# Patient Record
Sex: FEMALE | Race: WHITE
[De-identification: names, ages, dates, MRNs, and addresses within clinical notes are randomized per-mention and may not be internally consistent; named-entity substitution may affect disease eponyms.]

---

## 2019-07-16 ENCOUNTER — HOSPITAL ENCOUNTER (INPATIENT)
Dept: HOSPITAL 94 - ADULT MH | Age: 34
LOS: 16 days | Discharge: HOME | DRG: 885 | End: 2019-08-01
Attending: PSYCHIATRY & NEUROLOGY | Admitting: PSYCHIATRY & NEUROLOGY
Payer: MEDICARE

## 2019-07-16 VITALS — HEIGHT: 61 IN | WEIGHT: 182.76 LBS | BODY MASS INDEX: 34.51 KG/M2

## 2019-07-16 DIAGNOSIS — Z88.1: ICD-10-CM

## 2019-07-16 DIAGNOSIS — G89.4: ICD-10-CM

## 2019-07-16 DIAGNOSIS — R35.0: ICD-10-CM

## 2019-07-16 DIAGNOSIS — T42.4X2A: ICD-10-CM

## 2019-07-16 DIAGNOSIS — F41.9: ICD-10-CM

## 2019-07-16 DIAGNOSIS — Z79.890: ICD-10-CM

## 2019-07-16 DIAGNOSIS — Z79.899: ICD-10-CM

## 2019-07-16 DIAGNOSIS — F17.219: ICD-10-CM
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
## 2019-07-16 DIAGNOSIS — Z81.8: ICD-10-CM

## 2019-07-16 DIAGNOSIS — Z81.1: ICD-10-CM

## 2019-07-16 DIAGNOSIS — Z88.6: ICD-10-CM

## 2019-07-16 DIAGNOSIS — J45.909: ICD-10-CM

## 2019-07-16 DIAGNOSIS — Z88.8: ICD-10-CM

## 2019-07-16 DIAGNOSIS — F31.5: Primary | ICD-10-CM

## 2019-07-16 DIAGNOSIS — H54.8: ICD-10-CM

## 2019-07-16 DIAGNOSIS — E03.9: ICD-10-CM

## 2019-07-16 DIAGNOSIS — F12.90: ICD-10-CM

## 2019-07-16 DIAGNOSIS — G43.909: ICD-10-CM

## 2019-07-16 DIAGNOSIS — K76.0: ICD-10-CM

## 2019-07-16 PROCEDURE — 82150 ASSAY OF AMYLASE: CPT

## 2019-07-16 PROCEDURE — 84443 ASSAY THYROID STIM HORMONE: CPT

## 2019-07-16 PROCEDURE — 36415 COLL VENOUS BLD VENIPUNCTURE: CPT

## 2019-07-16 PROCEDURE — 82948 REAGENT STRIP/BLOOD GLUCOSE: CPT

## 2019-07-16 PROCEDURE — 83036 HEMOGLOBIN GLYCOSYLATED A1C: CPT

## 2019-07-16 PROCEDURE — 84439 ASSAY OF FREE THYROXINE: CPT

## 2019-07-16 PROCEDURE — 80061 LIPID PANEL: CPT

## 2019-07-16 PROCEDURE — 84480 ASSAY TRIIODOTHYRONINE (T3): CPT

## 2019-07-16 PROCEDURE — 85025 COMPLETE CBC W/AUTO DIFF WBC: CPT

## 2019-07-16 PROCEDURE — 81001 URINALYSIS AUTO W/SCOPE: CPT

## 2019-07-16 PROCEDURE — 83690 ASSAY OF LIPASE: CPT

## 2019-07-16 PROCEDURE — 87081 CULTURE SCREEN ONLY: CPT
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
## 2019-07-17 VITALS — DIASTOLIC BLOOD PRESSURE: 49 MMHG | SYSTOLIC BLOOD PRESSURE: 103 MMHG

## 2019-07-17 VITALS — DIASTOLIC BLOOD PRESSURE: 68 MMHG | SYSTOLIC BLOOD PRESSURE: 115 MMHG

## 2019-07-17 LAB
CHOLEST SERPL-MCNC: 152 MG/DL (ref 0–200)
CHOLEST/HDLC SERPL: 3.5 {RATIO} (ref 0–4.99)
HBA1C MFR BLD: 5 % (ref 4.5–6.2)
HDLC SERPL-MCNC: 44 MG/DL (ref 35–60)
LDLC SERPL DIRECT ASSAY-MCNC: 91 MG/DL (ref 50–100)
TRIGL SERPL-MCNC: 99 MG/DL (ref 20–135)

## 2019-07-17 RX ADMIN — Medication SCH MG: at 20:56

## 2019-07-17 RX ADMIN — FENTANYL TRANSDERMAL SCH PATCH: 25 PATCH, EXTENDED RELEASE TRANSDERMAL at 14:54

## 2019-07-17 RX ADMIN — LEVOTHYROXINE SODIUM SCH MCG: 88 TABLET ORAL at 17:18

## 2019-07-17 RX ADMIN — PROPRANOLOL HYDROCHLORIDE SCH MG: 10 TABLET ORAL at 20:54

## 2019-07-17 NOTE — NUR
Nursing Progress Note



Legal hold: 5150



Client on involuntary status for DTS.



Report received from Ruby SON, with use of SBAR.



Why are they here: The patient is a 33 year old female admitted on a 5150 hold for DTS. She 
was transported by EMS from [facility] after being evaluated by [facility] and a 5150 was written. She reportedly took 20 Klonopin tabs in a 
suicide attempt. While in there ER she AWOL'd from the ER. She was in physical restraints 
for over 24 hours at one point. When asked why she was in restraints she stated "because the 
doctor was a bitch" She also required increase observation while using the bathroom because 
she was engaging in self harm behaviors. She has an open area on her left hand from 
scratching. 



Assessment



What has happened this shift: Pt resting at change of shift. Once awake she reports needing 
a nicotine patch, Ativan and a Fentanyl patch for pain. Writer encouraged her to take a hot 
shower to assist with pain management, which she reports did not help. Writer consulted with 
Dr Giordano, Dr Saldana and pharmacy staff to get patch filled. Her Fentanyl patch from admit 
was removed and wasted with Yo SON. New patch was placed on Left Lower Abdomen. She has 
utilized her Ativan X2, Atarax today. Her Synthroid was also increased today. She was given 
oxycodone PO X1 to manage her pain which she consistently c/o all day. She was tearful and 
anxious all day. By the end of shift she was resting peacefully. PPD placed today. 









Assessment:



SI/HI: Confirms SI, says it worsens with pain

A/VH: report 

Sleep: 7.25 hrs NOC

ADL's: independent

Group attendance: yes

Were Meds taken: Yes

Any med S/E: Reports she has reaction to metal in nicotine patch and would like it changed 
to the clear kind



Mental Status Exam



Appearance: clean, hair braided

Eye contact: direct, thick glasses

Behavior: anxious, pained

Speech: clear, normal rate and rhythm

Mood: anxious, depressed

Affect: depressed

Thought process: linear

Thought Content: focused on pain management

Cognition: A & O X4

Insight: fair

Judgment: poor 



Interventions



PRN's used: Ativan X2, Atarax, Nicotine patch



Therapeutic interventions: attempted 1:1 assessment, provided therapeutic communication, 
redirection from excessive drinking when needed, encouraged to go to groups, medication 
administration/education/monitoring for compliance, monitor Q15 minutes for safety. Line of 
sight 



Restraints/seclusion/emergency medication: Line of Sight



Justification of Continued Inpatient Treatment: Pt. had a severe suicide attempt further 
medication adjustment to stabilize current crisis is needed. Without adequate treatment for 
current situation, pt is at high risk for readmission if discharged at this time.

## 2019-07-17 NOTE — NUR
Malnutrition consult: Pt admit w/ depression noted to have no edema/wounds, no weakness, PO 
100% regular meals meeting needs w/ BMI 33. At this time pt does not qualify for 
malnutrition.

-------------------------------------------------------------------------------

Addendum: 07/17/19 at 1120 by Juan [address]unas RD

-------------------------------------------------------------------------------

Amended: Links added.

## 2019-07-17 NOTE — NUR
Nursing Progress Note



Legal hold: 5150



Client on involuntary status for DTS.



Report received from Yo SON, with use of SBAR.



Why are they here: The patient is a 33 year old female admitted on a 5150 hold for DTS. She 
was transported by EMS from [facility] after being evaluated by [facility] and a 5150 was written. She reportedly took 20 Klonopin tabs in a 
suicide attempt. While in there ER she AWOL'd from the ER. She was in physical restraints 
for over 24 hours at one point. When asked why she was in restraints she stated "because the 
doctor was a bitch" She also required increase observation while using the bathroom because 
she was engaging in self harm behaviors. She has an open area on her left hand from 
scratching. 



Assessment



What has happened this shift: Patient is in bed with head covered at change of shift. She 
has a sitter at her bedside. Patient agrees to a 1:1 assessment. She states she is still 
having suicidal thoughts with a plan and expresses "That there's less risk (of her acting on 
her SI), I feel safe here." When asked if she would contract for safety she states "There is 
still some risk". She states she has 10/10 depression and that she is still having AH. She 
say's she feels better and that her pain has decreased to 6/10. Patient is isolative and 
does not leave her room or bed this shift except to use the restroom. She is compliant with 
her evening medications. 



Assessment:



SI/HI: Confirms SI, states "There's less risk, I feel safe here." But would not contract for 
safety stating "There is still some risk."

A/VH: AH

Sleep: See sleep assessment

ADL's: Independent

Group attendance: No groups this shift

Were Meds taken: Yes

Any med S/E: None reported or observed



Mental Status Exam



Appearance: Clean, hair braided

Eye contact: Direct, legally blind, but still has minimal vision looks at person talking.

Behavior: Anxious, isolative

Speech: Clear, normal rate and rhythm

Mood: Anxious, depressed

Affect: Congruent to mood

Thought process: Linear

Thought Content: Focused on her anxiety

Cognition: A & O X4

Insight: Fair

Judgment: Poor 



Interventions



PRN's used: Atarax



Therapeutic interventions: 1:1 assessment at bedside, provided therapeutic communication, 
encouraged to go to groups, medication administration/education/monitoring for compliance, 
monitor Q15 minutes for safety. Line of sight 



Restraints/seclusion/emergency medication: Line of Sight



Justification of Continued Inpatient Treatment: Pt. had a severe suicide attempt further 
medication adjustment to stabilize current crisis is needed. Without adequate treatment for 
current situation, pt is at high risk for readmission if discharged at this time.

## 2019-07-18 VITALS — DIASTOLIC BLOOD PRESSURE: 64 MMHG | SYSTOLIC BLOOD PRESSURE: 114 MMHG

## 2019-07-18 VITALS — DIASTOLIC BLOOD PRESSURE: 78 MMHG | SYSTOLIC BLOOD PRESSURE: 129 MMHG

## 2019-07-18 VITALS — SYSTOLIC BLOOD PRESSURE: 113 MMHG | DIASTOLIC BLOOD PRESSURE: 63 MMHG

## 2019-07-18 RX ADMIN — NICOTINE SCH PATCH: 21 PATCH, EXTENDED RELEASE TRANSDERMAL at 10:56

## 2019-07-18 RX ADMIN — LEVOTHYROXINE SODIUM SCH MCG: 88 TABLET ORAL at 07:04

## 2019-07-18 RX ADMIN — Medication SCH MG: at 20:20

## 2019-07-18 RX ADMIN — PROPRANOLOL HYDROCHLORIDE SCH MG: 10 TABLET ORAL at 13:08

## 2019-07-18 RX ADMIN — PROPRANOLOL HYDROCHLORIDE SCH MG: 10 TABLET ORAL at 20:20

## 2019-07-18 RX ADMIN — LEVOTHYROXINE, LIOTHYRONINE SCH MG: 19; 4.5 TABLET ORAL at 08:37

## 2019-07-18 RX ADMIN — DULOXETINE HYDROCHLORIDE SCH MG: 30 CAPSULE, DELAYED RELEASE ORAL at 08:36

## 2019-07-18 RX ADMIN — PROPRANOLOL HYDROCHLORIDE SCH MG: 10 TABLET ORAL at 08:36
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
## 2019-07-18 NOTE — NUR
Nursing Progress Note



Legal hold: 5150



Client on involuntary status for DTS.



Report received from Yo SON, with use of SBAR.



Why are they here: The patient is a 33 year old female admitted on a 5150 hold for DTS. She 
was transported by EMS from [facility] after being evaluated by [facility] and a 5150 was written. She reportedly took 20 Klonopin tabs in a 
suicide attempt. While in there ER she AWOL'd from the ER. She was in physical restraints 
for over 24 hours at one point. When asked why she was in restraints she stated "because the 
doctor was a bitch" She also required increase observation while using the bathroom because 
she was engaging in self harm behaviors. She has an open area on her left hand from 
scratching. 



Assessment



What has happened this shift: Patient is in bed with head covered at change of shift 
observation. She has a sitter at her bedside. Patient agrees to a 1:1 assessment. Asked how 
she felt today in terms of safety patient responded "I'm very suicidal. I still need someone 
to watch me. Probably for the next 24 hours."  When asked if she would contract for safety 
patient stated "I'll let you know if it gets bad."  Did not mention physical pain this 
shift.

Scheduled Ativan given at 1230. Patient slept for remainder of afternoon except to use 
bathroom.  



Assessment:



SI/HI: Admits to SI

A/VH: AH

Sleep: Napped this afternoon

ADL's: Independent/Showered

Group attendance: No groups this shift

Were Meds taken: Yes

Any med S/E: None reported or observed



Mental Status Exam



Appearance: Clean, hair braided

Eye contact: Direct, legally blind, but still has minimal vision looks at person talking.

Behavior: Anxious, isolative

Speech: Clear, normal rate and rhythm

Mood: Anxious, depressed

Affect: Congruent to mood

Thought process: Linear

Thought Content: Focused on her anxiety

Cognition: A & O X4

Insight: Fair

Judgment: Poor 



Interventions



PRN's used: Atarax 50mg./Ativan 1 mg./Zyprexa 10 mg.



Therapeutic interventions: 1:1 assessment at bedside, provided therapeutic communication, 
encouraged to go to groups, medication administration/education/monitoring for compliance, 
monitor Q15 minutes for safety. Line of sight 



Restraints/seclusion/emergency medication: Line of Sight



Justification of Continued Inpatient Treatment: Pt. had a severe suicide attempt further 
medication adjustment to stabilize current crisis is needed. Without adequate treatment for 
current situation, pt is at high risk for readmission if discharged at this time.

## 2019-07-18 NOTE — NUR
Nursing Progress Note



Legal hold: 5150



Client on involuntary status for DTS.



Report received from Sujata SON, with use of SBAR.



Why are they here: The patient is a 33 year old female admitted on a 5150 hold for DTS. She 
was transported by EMS from [facility] after being evaluated by [facility] and a 5150 was written. She reportedly took 20 Klonopin tabs in a 
suicide attempt. While in there ER she AWOL'd from the ER. She was in physical restraints 
for over 24 hours at one point. When asked why she was in restraints she stated "because the 
doctor was a bitch" She also required increase observation while using the bathroom because 
she was engaging in self harm behaviors. She has an open area on her left hand from 
scratching. 



Assessment



What has happened this shift: Patient is in bed with head covered at change of shift 
observation. She has a sitter at her bedside at her door observing her. Patient confirms SI 
stating "I would bang my head against the wall." She say's she still feels she is at risk 
for doing this. She say's she is having AH the voices say "I'm worthless, they tell me to 
kill myself." She say's at time the voices can be command. Patient expresses that she feels 
fatigued and she thinks it is related to her medication. She is compliant with evening 
medications and makes her bed after HS meds, then goes to bed. 



Assessment:



SI/HI: Admits to SI

A/VH: AH

Sleep: See sleep assessment

ADL's: Independent/Showered

Group attendance: No groups this shift

Were Meds taken: Yes

Any med S/E: None reported or observed



Mental Status Exam



Appearance: Clean, hair braided

Eye contact: Direct, legally blind, but still has minimal vision looks at person talking.

Behavior: Anxious, isolative

Speech: Clear, normal rate and rhythm

Mood: Anxious, depressed

Affect: Congruent to mood

Thought process: Linear

Thought Content: Focused on SI and medications making her tired

Cognition: A & O X4

Insight: Fair

Judgment: Poor 



Interventions



PRN's used: Trazodone



Therapeutic interventions: 1:1 assessment at bedside, provided therapeutic communication, 
encouraged to go to groups, medication administration/education/monitoring for compliance, 
monitor Q15 minutes for safety. Line of sight 



Restraints/seclusion/emergency medication: Line of Sight



Justification of Continued Inpatient Treatment: Pt. had a severe suicide attempt further 
medication adjustment to stabilize current crisis is needed. Without adequate treatment for 
current situation, pt is at high risk for readmission if discharged at this time.

## 2019-07-19 VITALS — DIASTOLIC BLOOD PRESSURE: 61 MMHG | SYSTOLIC BLOOD PRESSURE: 115 MMHG

## 2019-07-19 VITALS — SYSTOLIC BLOOD PRESSURE: 123 MMHG | DIASTOLIC BLOOD PRESSURE: 68 MMHG

## 2019-07-19 RX ADMIN — LEVOTHYROXINE, LIOTHYRONINE SCH MG: 19; 4.5 TABLET ORAL at 07:52

## 2019-07-19 RX ADMIN — PROPRANOLOL HYDROCHLORIDE SCH MG: 10 TABLET ORAL at 07:52

## 2019-07-19 RX ADMIN — DULOXETINE HYDROCHLORIDE SCH MG: 30 CAPSULE, DELAYED RELEASE ORAL at 07:52

## 2019-07-19 RX ADMIN — OXYCODONE PRN MG: 5 TABLET ORAL at 12:37

## 2019-07-19 RX ADMIN — NICOTINE SCH PATCH: 21 PATCH, EXTENDED RELEASE TRANSDERMAL at 08:00

## 2019-07-19 RX ADMIN — OLANZAPINE PRN MG: 5 TABLET, ORALLY DISINTEGRATING ORAL at 12:37

## 2019-07-19 RX ADMIN — PROPRANOLOL HYDROCHLORIDE SCH MG: 10 TABLET ORAL at 20:11

## 2019-07-19 RX ADMIN — Medication SCH MG: at 20:10

## 2019-07-19 RX ADMIN — LEVOTHYROXINE SODIUM SCH MCG: 88 TABLET ORAL at 07:51

## 2019-07-19 RX ADMIN — PROPRANOLOL HYDROCHLORIDE SCH MG: 10 TABLET ORAL at 12:37

## 2019-07-19 NOTE — NUR
Nursing Note: Pt. refusing to remove Nicotine Patch at HS, this writer provided education 
r/t to possibility of it causing NM, however pt. continues to refuse. Will endorse to AM 
shift

## 2019-07-19 NOTE — NUR
Verbal order from Dr Giordano to give hydroxyzine 50mg po stat d/t pt anxiety. Scheduled 
dose was in 30mins, override initiated, hydroxyzine administered.

## 2019-07-19 NOTE — NUR
Nursing Note: Pt. presented with 5250, signed, and reports she is agreeable with continued 
time on the unit in order to stabilize. Also, this writer received a phone call and fax from 
pt's MD, Michelle Black from [facility]. Fax provided 
additional medical information regarding pt's condition. Will endorse to AM shift, and leave 
for Dr. Giordano in the morning.

## 2019-07-19 NOTE — NUR
Nursing Progress Note



Legal hold: 5150



Client on involuntary status for DTS.



Report received from Samara Jones RN, with use of SBAR.



Why are they here: The patient is a 33 year old female admitted on a 5150 hold for DTS. She 
was transported by EMS from [facility] after being evaluated by [facility] and a 5150 was written. She reportedly took 20 Klonopin tabs in a 
suicide attempt. While in there ER she AWOL'd from the ER. She was in physical restraints 
for over 24 hours at one point. When asked why she was in restraints she stated "because the 
doctor was a bitch" She also required increase observation while using the bathroom because 
she was engaging in self harm behaviors. She has an open area on her left hand from 
scratching. 



Assessment



What has happened this shift: Patient is in bed with head covered at change of shift. She 
has a sitter at her bedside at her door observing her. Patient confirms SI but doesnt 
elaborate. She say's she still feels she is at risk. She reports AH that tell her, "I'm 
worthless, they tell me to kill myself." Patient looks tired. After breakfast she requests 
to use the phone to call [facility] to make a complaint. She also 
spoke with this writer about the [facility] schedule of activites and how it needs to accommodate 
people with disabilities, it should be spaced correctly and have at least 14 font. Which it 
clearly does not. Writer spoke with her about using a magnifying glass which she could use 
to read the schedule, books, and group handouts. JIMMY Matson also reports she increased all 
font sizes on her group handouts to accommodate pt. She is compliant with AM medications and 
requests all PRN medications as well. Pt preemptively asked for Zyprexa zydis saying, Can 
you give me that dissolvable Zyprexa before lunch? I think Ill need it then. RN encouraged 
her to use her medications when she has sx that warrant the use. She then requested it 
before lunch and asked how often it is scheduled for. RN advised her BID and she immediately 
asked to take another one shortly, although she is in no apparent distress and is resting 
peacefully on her bed. RN advised against this and encouraged her to rest. Pt then requested 
her Oxycodone. RN consulted with Dr Giordano about lowering her Propranolol d/t pt c/o 
dizziness. Propranolol was changed to 10mg PO TID. RN also requested to take pt off of LOS 
as she has remained resting in her bed a majority of the shift. She had a phone call with 
her sister which went well. PPD was read today which was Negative.



Assessment:



SI/HI: reports SI, no plan

A/VH: reports command AH

Sleep: See sleep assessment

ADL's: Independent/Showered

Group attendance: 

Were Meds taken: Yes

Any med S/E: None reported or observed



Mental Status Exam



Appearance: Clean, purple sweater

Eye contact: Direct, legally blind, but still has minimal vision looks at person talking.

Behavior: Sleepy, isolative

Speech: Mumbles softly into pillow during assessment with eyes closed

Mood: "Depressed and in pain"

Affect: Congruent to mood

Thought process: goal oriented

Thought Content: Focused on medications, timing of medications & pain

Cognition: A & O X4

Insight: Fair

Judgment: Poor 



Interventions



PRN's used: Atarax X2, Tylenol, Zyprexa zydis X2, Oxycodone



Therapeutic interventions: 1:1 assessment at bedside, provided therapeutic communication, 
encouraged to go to groups, medication administration/education/monitoring for compliance, 
monitor Q15 minutes for safety. Line of sight 



Restraints/seclusion/emergency medication: Line of Sight



Justification of Continued Inpatient Treatment: Pt. had a severe suicide attempt further 
medication adjustment to stabilize current crisis is needed. Without adequate treatment for 
current situation, pt is at high risk for readmission if discharged at this time.

## 2019-07-20 VITALS — SYSTOLIC BLOOD PRESSURE: 131 MMHG | DIASTOLIC BLOOD PRESSURE: 80 MMHG

## 2019-07-20 VITALS — DIASTOLIC BLOOD PRESSURE: 79 MMHG | SYSTOLIC BLOOD PRESSURE: 128 MMHG

## 2019-07-20 LAB
BACTERIA URNS QL MICRO: (no result) /HPF
CLARITY UR: (no result)
COLOR UR: (no result)
DEPRECATED SQUAMOUS URNS QL MICRO: (no result) /LPF
GLUCOSE UR STRIP-MCNC: NEGATIVE MG/DL
HGB UR QL STRIP: NEGATIVE
KETONES UR STRIP-MCNC: NEGATIVE MG/DL
LEUKOCYTE ESTERASE UR QL STRIP: NEGATIVE
MUCOUS THREADS URNS QL MICRO: (no result) /LPF
NITRITE UR QL STRIP: NEGATIVE
PH UR STRIP: 6.5 [PH] (ref 4.8–8)
PROT UR QL STRIP: NEGATIVE MG/DL
RBC #/AREA URNS HPF: (no result) /HPF (ref 0–2)
SP GR UR STRIP: <=1.005 (ref 1–1.03)
URN COLLECT METHOD CLASS: (no result)
UROBILINOGEN UR STRIP-MCNC: 0.2 E.U/DL (ref 0.2–1)
WBC #/AREA URNS HPF: (no result) /HPF (ref 0–4)

## 2019-07-20 RX ADMIN — DULOXETINE HYDROCHLORIDE SCH MG: 30 CAPSULE, DELAYED RELEASE ORAL at 07:47

## 2019-07-20 RX ADMIN — PANCRELIPASE LIPASE, PANCRELIPASE AMYLASE, AND PANCRELIPASE PROTEASE SCH EACH: 4200; 24600; 14200 CAPSULE, DELAYED RELEASE ORAL at 17:55

## 2019-07-20 RX ADMIN — FENTANYL TRANSDERMAL SCH PATCH: 25 PATCH, EXTENDED RELEASE TRANSDERMAL at 15:10

## 2019-07-20 RX ADMIN — LEVOTHYROXINE SODIUM SCH MCG: 88 TABLET ORAL at 07:47

## 2019-07-20 RX ADMIN — PROPRANOLOL HYDROCHLORIDE SCH MG: 10 TABLET ORAL at 07:49

## 2019-07-20 RX ADMIN — PROPRANOLOL HYDROCHLORIDE SCH MG: 10 TABLET ORAL at 20:27

## 2019-07-20 RX ADMIN — LEVOTHYROXINE, LIOTHYRONINE SCH MG: 19; 4.5 TABLET ORAL at 07:47

## 2019-07-20 RX ADMIN — Medication SCH MG: at 20:27

## 2019-07-20 RX ADMIN — PROPRANOLOL HYDROCHLORIDE SCH MG: 10 TABLET ORAL at 12:46

## 2019-07-20 RX ADMIN — NICOTINE SCH PATCH: 21 PATCH, EXTENDED RELEASE TRANSDERMAL at 07:53

## 2019-07-20 RX ADMIN — OLANZAPINE PRN MG: 5 TABLET, ORALLY DISINTEGRATING ORAL at 15:10

## 2019-07-20 RX ADMIN — OXYCODONE PRN MG: 5 TABLET ORAL at 07:48

## 2019-07-20 NOTE — NUR
Nursing Progress Note



Legal hold: 5150



Client on involuntary status for DTS.



Report received from Samara Jones RN, with use of SBAR.



Why are they here: The patient is a 33 year old female admitted on a 5150 hold for DTS. She 
was transported by EMS from [facility] after being evaluated by [facility] and a 5150 was written. She reportedly took 20 Klonopin tabs in a 
suicide attempt. While in there ER she AWOL'd from the ER. She was in physical restraints 
for over 24 hours at one point. When asked why she was in restraints she stated "because the 
doctor was a bitch" She also required increase observation while using the bathroom because 
she was engaging in self harm behaviors. She has an open area on her left hand from 
scratching. 



Assessment



What has happened this shift: Patient awakened before breakfast for medications and 
breakfast.  Patient states that she is very anxious and has been requesting all prns as soon 
as they are due.  Patient has poor vision and uses magnifying glass to look through.  
Patient reports that she is still suicidal without plan. Reports command A/H telling her to 
kill herself.  



SI/HI: reports SI, no plan

A/VH: reports command AH

Sleep: 7.5 hrs. NOC. Naps during daytime.

ADL's: Independent.

Group attendance: No.

Were Meds taken: Yes



Any med S/E: None reported or observed



Mental Status Exam



Appearance: Slightly disheveled in appearance, unit appropriate.

Eye contact: Direct, legally blind, but still has minimal vision looks at person talking.

Behavior: Sleepy, isolative

Speech: Clear, normal volume and rate.

Mood: Depressed, anxious.

Affect: Blunted.

Thought process: goal oriented

Thought Content: Focused on medications, timing of medications & pain

Cognition: A & O X4

Insight: Fair

Judgment: Poor 



Interventions



PRN's used: Atarax, Ativan, Tylenol, Zyprexa zydis, Oxycodone



Therapeutic interventions: 1:1 assessment at bedside, provided therapeutic communication, 
encouraged to go to groups, medication administration/education/monitoring for compliance, 
monitor Q15 minutes for safety. Line of sight 



Restraints/seclusion/emergency medication: None.



Justification of Continued Inpatient Treatment: Pt. had a severe suicide attempt further 
medication adjustment to stabilize current crisis is needed. Without adequate treatment for 
current situation, pt is at high risk for readmission if discharged at this time.

## 2019-07-20 NOTE — NUR
Nursing Progress Note:



Legal hold: 5250



Client on involuntary status for DTS



Report received from nurse with use of SBAR: ADELAIDA Ernst



Why are they here: The patient is a 33 year old female admitted on a 5150 hold for DTS. She 
was transported by EMS from [facility] after being evaluated by [facility] and a 5150 was written. She reportedly took 20 Klonopin tabs in a 
suicide attempt r/t interfamilial and job stress. While in there ER she AWOL'd from the ER. 
She was in physical restraints for over 24 hours at one point. She also required increase 
observation while using the bathroom because she was engaging in self harm behaviors and was 
on LOS X2 days while at [facility]. She has an open area on her left hand from scratching. Pt. 
endorses command A/HA and has a hx of psychiatric hospitalizations and suicide attempts. 





Assessment



What has happened this shift: Pt. laying in bed sleeping at the beginning of the shift, 
later approached this writer to request a PRN for anxiety, however did not exhibit any s/s 
of of anxiety. Scheduled Zyprexa administered and pt. reported effectiveness. Pt. questioned 
this writer regarding the time of day and "if she had received all of her morning 
medications?" This writer educated pt. that it was now evening, and assured her that she had 
received all of her AM medications, pt. voiced understanding. Pt. is alert and oriented X3, 
however in regard to place reported that she is at [facility]. Pt. reports ongoing S/I 
with a plan to overdose on medications. She denies any self-harm behaviors and none 
exhibited this shift. Pt. endorses ongoing command A/HA, however reports they are better 
since taking medications. She reports she lives with her mother and this can be stressful 
because they do not agree on certain things, however she states a coping mechanism as, 
"Thinking about the ocean." Pt. reports she attends groups, but has a hard time 
participating at times because of her poor sight. This writer assured pt. she  would endorse 
this concern to AM shift, and pt. reported content. Pt. appeared to become increasingly 
restless at HS and reported insomnia, PRN Atrax and Trazodone administered with 
effectiveness. 

S/I, H/I: Continued S/I with a plan to overdose on medications

A/VH: Ongoing Command A/HA

Sleep: Difficulty falling asleep, PRN Trazodone and MRX1 dose administered with 
effectiveness

ADL's: Independent

Group attendance: Reports she attends groups, however has a difficult time participating r/t 
her visual disability. 

Were meds taken: Yes

Any med S/E: None





Mental Status Exam



Appearance: Neat and appropriately dressed

Eye contact: Good

Behavior: Cooperative with some restlessness and fatigue

Speech: WNL

Mood: Restless with ongoing depression

Affect: Constricted

Thought process: Linear

Thought Content: Command A/HA and preoccupation with depressed mood and S/I

Cognition: A&O X3 (not to place, reports [facility])

Insight: Poor to fair

Judgment: Poor to fair



Interventions



PRN's used: Atrax X1, Tylenol X1, Trazodone X2 (MRX1 dose)

Therapeutic interventions: Introduced self and established rapport, ensured contract for 
safety, encouraged independent performance of ADLs, monitored behavior and need for 
intervention, reoriented to reality as needed, presented pt. with 5250 paperwork and 
obtained signature, and maintained Q 15 min safety checks. 

Restraints/seclusion/emergency medication: N/A





Justification of Continued Inpatient Treatment: Pt. continues to require interruption of 
crisis, medication adjustments, and a safe and therapeutic environment.

## 2019-07-21 VITALS — DIASTOLIC BLOOD PRESSURE: 50 MMHG | SYSTOLIC BLOOD PRESSURE: 99 MMHG

## 2019-07-21 VITALS — DIASTOLIC BLOOD PRESSURE: 81 MMHG | SYSTOLIC BLOOD PRESSURE: 125 MMHG

## 2019-07-21 LAB
AMYLASE SERPL-CCNC: 48 U/L (ref 25–115)
LIPASE SERPL-CCNC: 107 U/L (ref 73–393)

## 2019-07-21 RX ADMIN — PROPRANOLOL HYDROCHLORIDE SCH MG: 10 TABLET ORAL at 12:28

## 2019-07-21 RX ADMIN — PANCRELIPASE LIPASE, PANCRELIPASE AMYLASE, AND PANCRELIPASE PROTEASE SCH EACH: 4200; 24600; 14200 CAPSULE, DELAYED RELEASE ORAL at 17:52

## 2019-07-21 RX ADMIN — DULOXETINE HYDROCHLORIDE SCH MG: 30 CAPSULE, DELAYED RELEASE ORAL at 07:46

## 2019-07-21 RX ADMIN — MAGNESIUM HYDROXIDE PRN ML: 400 SUSPENSION ORAL at 10:33

## 2019-07-21 RX ADMIN — LEVOTHYROXINE, LIOTHYRONINE SCH MG: 19; 4.5 TABLET ORAL at 07:46

## 2019-07-21 RX ADMIN — Medication SCH MG: at 20:36

## 2019-07-21 RX ADMIN — OXYCODONE PRN MG: 5 TABLET ORAL at 13:38

## 2019-07-21 RX ADMIN — PANCRELIPASE LIPASE, PANCRELIPASE AMYLASE, AND PANCRELIPASE PROTEASE SCH EACH: 4200; 24600; 14200 CAPSULE, DELAYED RELEASE ORAL at 12:28

## 2019-07-21 RX ADMIN — PROPRANOLOL HYDROCHLORIDE SCH MG: 10 TABLET ORAL at 07:47

## 2019-07-21 RX ADMIN — PROPRANOLOL HYDROCHLORIDE SCH MG: 10 TABLET ORAL at 20:36

## 2019-07-21 RX ADMIN — OLANZAPINE PRN MG: 5 TABLET, ORALLY DISINTEGRATING ORAL at 00:28

## 2019-07-21 RX ADMIN — LEVOTHYROXINE SODIUM SCH MCG: 88 TABLET ORAL at 06:36

## 2019-07-21 RX ADMIN — NICOTINE SCH PATCH: 21 PATCH, EXTENDED RELEASE TRANSDERMAL at 07:46

## 2019-07-21 RX ADMIN — DOCUSATE SODIUM SCH MG: 100 CAPSULE, LIQUID FILLED ORAL at 19:49

## 2019-07-21 RX ADMIN — DOCUSATE SODIUM SCH MG: 100 CAPSULE, LIQUID FILLED ORAL at 10:33

## 2019-07-21 RX ADMIN — OLANZAPINE PRN MG: 5 TABLET, ORALLY DISINTEGRATING ORAL at 10:33

## 2019-07-21 RX ADMIN — PANCRELIPASE LIPASE, PANCRELIPASE AMYLASE, AND PANCRELIPASE PROTEASE SCH EACH: 4200; 24600; 14200 CAPSULE, DELAYED RELEASE ORAL at 07:47

## 2019-07-21 NOTE — NUR
Nursing Progress Note:



Legal hold: 5250



Client on involuntary status for DTS



Report received from nurse with use of SBAR: ADELAIDA Ernst



Why are they here: The patient is a 33 year old female admitted on a 5150 hold for DTS. She 
was transported by EMS from [facility] after being evaluated by [facility] and a 5150 was written. She reportedly took 20 Klonopin tabs in a 
suicide attempt r/t interfamilial and job stress. While in there ER she AWOL'd from the ER. 
She was in physical restraints for over 24 hours at one point. She also required increase 
observation while using the bathroom because she was engaging in self harm behaviors and was 
on LOS X2 days while at [facility]. She has an open area on her left hand from scratching. Pt. 
endorses command A/HA and has a hx of psychiatric hospitalizations and suicide attempts. 





Assessment



What has happened this shift: Pt. up in Group Room at the beginning of the shift interacting 
appropriately with others, and appears more animated than the day before. Immediately 
following HS snack she retreats to her room and requests HS medications. 1:1 completed at 
bedside, affect remains constricted, however pt. is cooperative and pleasant with underlying 
anxiety. She reports ongoing S/I with a plan to overdose on medications, but is able to 
contract for safety and no self harm behaviors exhibited. This writer obtained picture of 
previously self-inflicted abrasion on left hand, dsg covering it is CDI, and picture placed 
in chart. Pt. states, "My mood is mixed, up and down between manic and sad." However, she 
reports that her command A/H are better and much harder to distinguish since taking Zyprexa. 
She also reports that she has been having conversations with her mother and sister on the 
telephone and the conversations have been positive. Pt. is unsure if she will be returning 
straight home  of if she will possibly be going to a step-down facility upon discharge, she 
plans to discuss this more with the . Pt. again appears to become increasingly 
restless at HS and reports insomnia, PRN Atrax and Trazodone X2 administered and will 
monitor. Also, Fentanyl patch placement verified upon right flank and Tegaderm Dressing 
placed over it to hold in place. 

S/I, H/I: Continued S/I with a plan to overdose on medications

A/VH: Ongoing Command A/HA, however hard to distinguish

Sleep: Difficulty falling asleep, PRN Trazodone and MRX1 dose administered 

ADL's: Independent

Group attendance: Reports she attends groups

Were meds taken: Yes

Any med S/E: None





Mental Status Exam



Appearance: Neat and appropriately dressed

Eye contact: Good, however pt. looks down a lot

Behavior: Cooperative with some anxiety and fatigue

Speech: WNL

Mood: More animated today

Affect: Constricted

Thought process: Linear

Thought Content: Command A/HA and preoccupation with depressed mood and S/I

Cognition: A&O X4

Insight: Poor to fair

Judgment: Poor to fair



Interventions



PRN's used: Atrax X1, Tylenol X1, Trazodone X2 (MRX1 dose), and Zyprexa Zydis

Therapeutic interventions: Maintained a safe and therapeutic environment, ensured contract 
for safety, encouraged independent performance of ADLs, monitored behavior and need for 
intervention, reoriented to reality as needed, obtained picture of previously self-inflicted 
abrasion on left hand, and maintained Q 15 min safety checks. 

Restraints/seclusion/emergency medication: N/A





Justification of Continued Inpatient Treatment: Pt. continues to require interruption of 
crisis, medication adjustments, and a safe and therapeutic environment.

## 2019-07-21 NOTE — NUR
DISCHARGE PLANNING:



Spoke w/ staff at [facility] in [facility], similar to [facility]. They can not take pt for same 
reason [facility] can not, pt must be from [facility] or relocating to [facility]. Spoke to Ginette 
at [facility] @ [phone] and explained looking for a step-down housing 
situation (explained what [facility] is) they do not have anything like that but she said they 
might have some type of step-down support housing pt could go to, she would find out and 
contact us. Also asked if they had a list of [facility], she will also check. Gave her fax # and 
Cathleen's #. Also gave pt Dr. BENZ gave me to pass on that pt could research independently.



Huyen Sandy, KENNETHW

## 2019-07-21 NOTE — NUR
Nursing Progress Note



Legal hold: 5150



Client on involuntary status for DTS.



Report received from Samara Jones RN, with use of SBAR.



Why are they here: The patient is a 33 year old female admitted on a 5150 hold for DTS. She 
was transported by EMS from [facility] after being evaluated by [facility] and a 5150 was written. She reportedly took 20 Klonopin tabs in a 
suicide attempt. While in there ER she AWOL'd from the ER. She was in physical restraints 
for over 24 hours at one point. When asked why she was in restraints she stated "because the 
doctor was a bitch" She also required increase observation while using the bathroom because 
she was engaging in self harm behaviors. She has an open area on her left hand from 
scratching. 



Assessment



What has happened this shift: As soon as report was over, RN was met by patient asking for 
prns, pt appears to like to keep herself at a certain medicated level. Pt. asked if we could 
make accommodations for her visual impairment.  Informed her that we have increased the font 
size as suggested for handouts, and had given her a magnifier to help her to see.  She 
states she is going to call a center tomorrow and request a more powerful magnifier.  
Patient states that she didn't sleep well last night and slept for a few hours during 
morning.  She reports feeling constipated, Colace given, MOM, prune juice x 2.  





SI/HI: reports SI. Contracts for safety while here, but states she has pills at home.

A/VH: reports command AH, states voices are getting less.

Sleep: 6.0 hrs. NOC. Naps during daytime.

ADL's: Independent.

Group attendance: Yes.

Were Meds taken: Yes



Any med S/E: None reported or observed



Mental Status Exam



Appearance: Pt. with thick glasses, slightly disheveled in appearance, unit appropriate.

Eye contact: Direct, legally blind, but still has minimal vision looks at person talking.

Behavior: Sleepy, cooperative, anxious.

Speech: Clear, normal volume and rate.

Mood: Depressed, anxious.

Affect: Blunted.

Thought process: goal oriented

Thought Content: Focused on medications, timing of medications & pain

Cognition: A & O X4

Insight: Fair

Judgment: Poor 



Interventions



PRN's used: Atarax, Zyprexa zydis, Oxycodone, MOM, nicotine lozenge 



Therapeutic interventions: 1:1 assessment at bedside, provided therapeutic communication, 
encouraged to go to groups, medication administration/education/monitoring for compliance, 
monitor Q15 minutes for safety. 



Restraints/seclusion/emergency medication: None.



Justification of Continued Inpatient Treatment: Pt. had a severe suicide attempt further 
medication adjustment to stabilize current crisis is needed. Without adequate treatment for 
current situation, pt is at high risk for readmission if discharged at this time

## 2019-07-21 NOTE — NUR
Patient in room . I have received report from ADELAIDA Ernst   and had the opportunity to 
ask questions and assume patient care.

## 2019-07-21 NOTE — NUR
Nursing Note: Pt. unable to sleep and reporting anxiety, Atrax had previously been 
administered, administered PRN Zyprexa Zydis.

## 2019-07-22 VITALS — SYSTOLIC BLOOD PRESSURE: 121 MMHG | DIASTOLIC BLOOD PRESSURE: 86 MMHG

## 2019-07-22 VITALS — SYSTOLIC BLOOD PRESSURE: 119 MMHG | DIASTOLIC BLOOD PRESSURE: 76 MMHG

## 2019-07-22 RX ADMIN — DULOXETINE HYDROCHLORIDE SCH MG: 30 CAPSULE, DELAYED RELEASE ORAL at 08:19

## 2019-07-22 RX ADMIN — OLANZAPINE SCH MG: 5 TABLET, FILM COATED ORAL at 20:05

## 2019-07-22 RX ADMIN — DOCUSATE SODIUM SCH MG: 100 CAPSULE, LIQUID FILLED ORAL at 08:18

## 2019-07-22 RX ADMIN — NICOTINE SCH PATCH: 21 PATCH, EXTENDED RELEASE TRANSDERMAL at 07:21

## 2019-07-22 RX ADMIN — LEVOTHYROXINE SODIUM SCH MCG: 88 TABLET ORAL at 07:21

## 2019-07-22 RX ADMIN — PANCRELIPASE LIPASE, PANCRELIPASE AMYLASE, AND PANCRELIPASE PROTEASE SCH EACH: 4200; 24600; 14200 CAPSULE, DELAYED RELEASE ORAL at 08:22

## 2019-07-22 RX ADMIN — OXYCODONE PRN MG: 5 TABLET ORAL at 09:07

## 2019-07-22 RX ADMIN — PROPRANOLOL HYDROCHLORIDE SCH MG: 10 TABLET ORAL at 08:18

## 2019-07-22 RX ADMIN — OLANZAPINE SCH MG: 5 TABLET, FILM COATED ORAL at 08:19

## 2019-07-22 RX ADMIN — PROPRANOLOL HYDROCHLORIDE SCH MG: 10 TABLET ORAL at 13:01

## 2019-07-22 RX ADMIN — PROPRANOLOL HYDROCHLORIDE SCH MG: 10 TABLET ORAL at 20:05

## 2019-07-22 RX ADMIN — DOCUSATE SODIUM SCH MG: 100 CAPSULE, LIQUID FILLED ORAL at 20:05

## 2019-07-22 RX ADMIN — Medication SCH MG: at 20:06

## 2019-07-22 RX ADMIN — PANCRELIPASE LIPASE, PANCRELIPASE AMYLASE, AND PANCRELIPASE PROTEASE SCH EACH: 4200; 24600; 14200 CAPSULE, DELAYED RELEASE ORAL at 17:58

## 2019-07-22 RX ADMIN — NICOTINE POLACRILEX PRN LOZ: 2 LOZENGE ORAL at 15:22

## 2019-07-22 RX ADMIN — PANCRELIPASE LIPASE, PANCRELIPASE AMYLASE, AND PANCRELIPASE PROTEASE SCH EACH: 4200; 24600; 14200 CAPSULE, DELAYED RELEASE ORAL at 13:01

## 2019-07-22 NOTE — NUR
Nursing Progress Note



Legal hold: 5250



Client on involuntary status for DTS.



Report received from Samara Jones RN, with use of SBAR.



Why are they here: The patient is a 33 year old female admitted on a 5150 hold for DTS. She 
was transported by EMS from [facility] after being evaluated by [facility] and a 5150 was written. She reportedly took 20 Klonopin tabs in a 
suicide attempt. While in there ER she AWOL'd from the ER. She was in physical restraints 
for over 24 hours at one point. When asked why she was in restraints she stated "because the 
doctor was a bitch" She also required increase observation while using the bathroom because 
she was engaging in self harm behaviors. She has an open area on her left hand from 
scratching. 



Assessment



What has happened this shift: Patient pleasant, states depressed due to back pain and has 
suicidal thoughts. Fentanyl patch is helpful. States she hears voices stating she is not 
worthy and tell her to kill herself. States no active plan, but does have access to pills at 
home. States she has visual hallucinations that she is in the jungle. This causes her to 
cover her head with blankets for fear of what she may see. Patient got attention of MD to 
request increase in zyprexa dose which was granted. PRN/BID zyprexa dose remains unchanged. 
Lithium level increased 1.1 from 1.0. MD decreased lithium dose by half to decrease levels 
after speaking with patient. 





SI/HI: reports SI. Contracts for safety while here, but states she has pills at home.

A/VH: reports command AH

Sleep: Sleeping since 2200

ADL's: Independent.

Group attendance: N/A

Were Meds taken: Yes



Any med S/E: None reported or observed



Mental Status Exam



Appearance: Pt. with thick glasses, slightly disheveled in appearance, unit appropriate.

Eye contact: Direct, legally blind, but still has minimal vision looks at person talking.

Behavior: Sleepy, cooperative, anxious.

Speech: Clear, normal volume and rate.

Mood: Depressed, anxious.

Affect: Blunted.

Thought process: goal oriented

Thought Content: Focused on medications & medication timing/doses

Cognition: A & O X4

Insight: Fair

Judgment: Poor 



Interventions



PRN's used: Atarax



Therapeutic interventions: 1:1 assessment at bedside, provided therapeutic communication, 
encouraged to go to groups, medication administration/education/monitoring for compliance, 
monitor Q15 minutes for safety. 



Restraints/seclusion/emergency medication: None.



Justification of Continued Inpatient Treatment: Pt. had a severe suicide attempt further 
medication adjustment to stabilize current crisis is needed. Without adequate treatment for 
current situation, pt is at high risk for readmission if discharged at this time

## 2019-07-22 NOTE — NUR
Nursing Progress Note



Legal hold: 5150



Client on involuntary status for DTS.



Report received from Sujata SON, with use of SBAR.



Why are they here: The patient is a 33 year old female admitted on a 5150 hold for DTS. She 
was transported by EMS from [facility] after being evaluated by [facility] and a 5150 was written. She reportedly took 20 Klonopin tabs in a 
suicide attempt. While in there ER she AWOL'd from the ER. She was in physical restraints 
for over 24 hours at one point. When asked why she was in restraints she stated "because the 
doctor was a bitch" She also required increase observation while using the bathroom because 
she was engaging in self harm behaviors. She has an open area on her left hand from 
scratching. 



Assessment



What has happened this shift: Pt was in the levin at change of shift and requested a shower 
and clean scrubs. 1:1 assessment completed at bedside. Pt is tearful, stating she had a bad 
day. states her day started bad when she spilled coffee on herself. States she is upset 
about being told to not use pills as a coping skill, pt states this upset her because her 
mother says the same thing to her. Pt is crying about limited water intake, states she 
doesnt feel she should have her water intake monitored, educated pt on overhydration and 
reason for limitations. Pt was med compliant and was able to calm herself w/reading a book 
before going to sleep. pt denies s/i, denies a/vh. pt does not want to take off nicotine 
patch before bed.



SI/HI: pt denies s/i 

A/VH: denies a/vh 

Sleep: pt reports sleeping well w/trazadone 

ADL's: Independent.

Group attendance: no evening groups 

Were Meds taken: Yes



Any med S/E: thirst 



Mental Status Exam



Appearance: Pt. with thick glasses, adequately groomed and dressed for environment, pt 
showered tonight 

Eye contact: Direct 

Behavior: Anxious, tearful, showered and reading a book 

Speech: Clear, normal volume and rate.

Mood: Depressed, anxious.

Affect: Blunted.

Thought process: goal oriented

Thought Content: c/o not being treated fairly, water intake 

Cognition: A & O X4

Insight: Poor.

Judgment: Poor 



Interventions



PRN's used: nicotine lozenge, trazadone 



Therapeutic interventions: 1:1 assessment at bedside, provided therapeutic communication, 
encouraged to go to groups, water restriction as above, coping skills, medication 
administration/education/monitoring for compliance, monitor Q15 minutes for safety. 



Restraints/seclusion/emergency medication: None.



Justification of Continued Inpatient Treatment: Pt. had a severe suicide attempt further 
medication adjustment to stabilize current crisis is needed, monitoring patient behaviors. 
Without adequate treatment for current situation, pt is at high risk for readmission if 
discharged at this time

## 2019-07-23 VITALS — DIASTOLIC BLOOD PRESSURE: 76 MMHG | SYSTOLIC BLOOD PRESSURE: 145 MMHG

## 2019-07-23 VITALS — DIASTOLIC BLOOD PRESSURE: 69 MMHG | SYSTOLIC BLOOD PRESSURE: 121 MMHG

## 2019-07-23 RX ADMIN — PROPRANOLOL HYDROCHLORIDE SCH MG: 10 TABLET ORAL at 20:17

## 2019-07-23 RX ADMIN — PROPRANOLOL HYDROCHLORIDE SCH MG: 10 TABLET ORAL at 07:36

## 2019-07-23 RX ADMIN — PANCRELIPASE LIPASE, PANCRELIPASE AMYLASE, AND PANCRELIPASE PROTEASE SCH EACH: 4200; 24600; 14200 CAPSULE, DELAYED RELEASE ORAL at 07:37

## 2019-07-23 RX ADMIN — PANCRELIPASE LIPASE, PANCRELIPASE AMYLASE, AND PANCRELIPASE PROTEASE SCH EACH: 4200; 24600; 14200 CAPSULE, DELAYED RELEASE ORAL at 16:58

## 2019-07-23 RX ADMIN — NICOTINE POLACRILEX PRN LOZ: 2 LOZENGE ORAL at 08:43

## 2019-07-23 RX ADMIN — OLANZAPINE PRN MG: 5 TABLET, ORALLY DISINTEGRATING ORAL at 18:40

## 2019-07-23 RX ADMIN — OLANZAPINE SCH MG: 5 TABLET, FILM COATED ORAL at 07:37

## 2019-07-23 RX ADMIN — OLANZAPINE SCH MG: 5 TABLET, FILM COATED ORAL at 20:16

## 2019-07-23 RX ADMIN — Medication SCH MG: at 20:16

## 2019-07-23 RX ADMIN — DULOXETINE HYDROCHLORIDE SCH MG: 30 CAPSULE, DELAYED RELEASE ORAL at 07:37

## 2019-07-23 RX ADMIN — FENTANYL TRANSDERMAL SCH PATCH: 25 PATCH, EXTENDED RELEASE TRANSDERMAL at 15:03

## 2019-07-23 RX ADMIN — PANCRELIPASE LIPASE, PANCRELIPASE AMYLASE, AND PANCRELIPASE PROTEASE SCH EACH: 4200; 24600; 14200 CAPSULE, DELAYED RELEASE ORAL at 12:11

## 2019-07-23 RX ADMIN — LEVOTHYROXINE SODIUM SCH MCG: 88 TABLET ORAL at 07:36

## 2019-07-23 RX ADMIN — ALUMINUM HYDROXIDE, MAGNESIUM HYDROXIDE, AND SIMETHICONE PRN ML: 200; 200; 20 SUSPENSION ORAL at 14:47

## 2019-07-23 RX ADMIN — DOCUSATE SODIUM SCH MG: 100 CAPSULE, LIQUID FILLED ORAL at 20:17

## 2019-07-23 RX ADMIN — MAGNESIUM HYDROXIDE PRN ML: 400 SUSPENSION ORAL at 20:19

## 2019-07-23 RX ADMIN — DOCUSATE SODIUM SCH MG: 100 CAPSULE, LIQUID FILLED ORAL at 07:36

## 2019-07-23 RX ADMIN — NICOTINE POLACRILEX PRN LOZ: 2 LOZENGE ORAL at 16:57

## 2019-07-23 RX ADMIN — NICOTINE SCH PATCH: 21 PATCH, EXTENDED RELEASE TRANSDERMAL at 07:36

## 2019-07-23 RX ADMIN — PROPRANOLOL HYDROCHLORIDE SCH MG: 10 TABLET ORAL at 12:11

## 2019-07-23 NOTE — NUR
Nursing Progress Note



Legal hold: 5150



Client on involuntary status for DTS.



Report received from Ruby SON, with use of SBAR.



Why are they here: The patient is a 33 year old female admitted on a 5150 hold for DTS. She 
was transported by EMS from [facility] after being evaluated by [facility] and a 5150 was written. She reportedly took 20 Klonopin tabs in a 
suicide attempt. While in there ER she AWOL'd from the ER. She was in physical restraints 
for over 24 hours at one point. When asked why she was in restraints she stated "because the 
doctor was a bitch" She also required increase observation while using the bathroom because 
she was engaging in self harm behaviors. She has an open area on her left hand from 
scratching. 



Assessment



What has happened this shift: Pt was in the levin at change of shift requesting water. Pt 
reported that she feels bullied because she was put on water restriction. She talked at 
length about how she believes water should be available to everyone if they are thirsty and 
how the unit should have a water fountain for people to drink as they please and not have to 
rely on techs for water. This writer explained to her the reasoning for water restriction 
(i.e., over-hydration & electrolyte imbalance) and how she is not being bullied. She also 
has concerns about continuing nicotine patches & maribell once she discharges because she would 
like to quit smoking. She requests a nicotine maribell immediately after nicotine patch was 
placed. She reports last BM was yesterday. Denies any SI, AH/VH. Reports sleeping well last 
night. Denies any SEs from medications, none objectively observed. Pts sister called in late 
afternoon wanting to know medications pt is on and her demeanor on the unit. Sister (Pricilla Tee [phone]) reports concerns about pt taking Propranolol since she recalls pt 
taking it in the past and having severe depression sx because of it. 



SI/HI: Denies SI

A/VH: Denies A/H V/H

Sleep: 7.25 hrs NOC

ADL's: Independent

Group attendance: no

Were Meds taken: Yes



Any med S/E: pt c/o constant thirst 



Mental Status Exam



Appearance: Pt. with thick glasses, adequately groomed and dressed for environment, pt 
showered yesterday

Eye contact: mostly direct 

Behavior: Anxious, tired

Speech: Clear, normal volume and rate

Mood: Depressed, anxious

Affect: Blunted

Thought process: Goal oriented

Thought Content: c/o being bullied about water intake 

Cognition: A & O X4

Insight: Poor

Judgment: Poor 



Interventions



PRN's used: nicotine lozenge X2, Tyenol X1



Therapeutic interventions: 1:1 assessment at bedside, provided therapeutic communication, 
encouraged to go to groups, water restriction as above, coping skills, medication 
administration/education/monitoring for compliance, monitor Q15 minutes for safety. 



Restraints/seclusion/emergency medication: None.



Justification of Continued Inpatient Treatment: Pt. had a severe suicide attempt further 
medication adjustment to stabilize current crisis is needed, monitoring patient behaviors. 
Without adequate treatment for current situation, pt is at high risk for readmission if 
discharged at this time
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
## 2019-07-23 NOTE — NUR
Nursing Progress Note



Legal hold: 5150



Client on involuntary status for DTS.



Report received from Sujata SON, with use of SBAR.



Why are they here: The patient is a 33 year old female admitted on a 5150 hold for DTS. She 
was transported by EMS from [facility] after being evaluated by [facility] and a 5150 was written. She reportedly took 20 Klonopin tabs in a 
suicide attempt. While in there ER she AWOL'd from the ER. She was in physical restraints 
for over 24 hours at one point. When asked why she was in restraints she stated "because the 
doctor was a bitch" She also required increase observation while using the bathroom because 
she was engaging in self harm behaviors. She has an open area on her left hand from 
scratching. 



Assessment



What has happened this shift: pt was in the hallway at change of shift. 1:1 completed at 
bedside. Pt is reporting anxiety and requesting zyprexa, pt states she was supposed to get 
zyprexa earlier and was refused zyprexa, explained to pt she was given prns for anxiety not 
to long ago but pt insists she continues to feel anxious and zyprexa is all that will help. 
pt also asks for prn for constipation. Pt is concerned w/discharge plan stating that she 
feels she needs to be in a "respite care" home for a while before going home. Pt states she 
feels there are too many "temptations there like pills, knives, and gasoline" that she can 
suicide with. Pt states "In a perfect world I could live on my own and just have someone 
that comes in and helps me with my meds once in a while, I can't afford to be on my own." Pt 
is concerned she will have negative effects from propranolol because her twin sister had 
negative s/e. pt states her day was better than yesterday, she spent evening reading  and 
took a phone call and told caller she is staying in the "[facility] facilities". Pt is 
in a pleasant mood this evening despite endorsing s/i.



SI/HI: Denies SI/HI 

A/VH: Denies 

Sleep: slept well w/trazadone 

ADL's: Independent

Group attendance: no

Were Meds taken: Yes



Any med S/E: pt c/o excessive thirst 



Mental Status Exam



Appearance: Pt. with thick glasses, adequately groomed and dressed for environment, pt 
showered yesterday

Eye contact: mostly direct 

Behavior: reading a book, asking for prns, was encouraged to use coping skills. 

Speech: Clear, normal volume and rate

Mood: reports Depressed, anxious, 

Affect: blunted w/some brightening 

Thought process: Goal oriented

Thought Content: talking about d/c plan

Cognition: A & O X4

Insight: Poor

Judgment: Poor 



Interventions



PRN's used: zyprexa, milk of mag 



Therapeutic interventions: 1:1 assessment at bedside, provided therapeutic communication, 
encouraged to go to groups, water restriction as above, coping skills, medication 
administration/education/monitoring for compliance, monitor Q15 minutes for safety. 



Restraints/seclusion/emergency medication: None.



Justification of Continued Inpatient Treatment: Pt. had a severe suicide attempt further 
medication adjustment to stabilize current crisis is needed, monitoring patient behaviors. 
Without adequate treatment for current situation, pt is at high risk for readmission if 
discharged at this time

-------------------------------------------------------------------------------

Addendum: 07/24/19 at 0522 by Milagro Wilson RN

-------------------------------------------------------------------------------

Pt woke about 3am, tearful asking for prns and water. Provided pt w/3rd pitcher of water and 
nicotine lozenge. pt was asking "why am I broken?" suggested pt attempt to get more rest. Pt 
fell back asleep shortly after.

## 2019-07-24 VITALS — SYSTOLIC BLOOD PRESSURE: 137 MMHG | DIASTOLIC BLOOD PRESSURE: 93 MMHG

## 2019-07-24 VITALS — DIASTOLIC BLOOD PRESSURE: 81 MMHG | SYSTOLIC BLOOD PRESSURE: 129 MMHG

## 2019-07-24 RX ADMIN — PROPRANOLOL HYDROCHLORIDE SCH MG: 10 TABLET ORAL at 07:17

## 2019-07-24 RX ADMIN — NICOTINE POLACRILEX PRN LOZ: 2 LOZENGE ORAL at 02:58

## 2019-07-24 RX ADMIN — PROPRANOLOL HYDROCHLORIDE SCH MG: 10 TABLET ORAL at 20:52

## 2019-07-24 RX ADMIN — PANCRELIPASE LIPASE, PANCRELIPASE AMYLASE, AND PANCRELIPASE PROTEASE SCH EACH: 4200; 24600; 14200 CAPSULE, DELAYED RELEASE ORAL at 07:17

## 2019-07-24 RX ADMIN — DOCUSATE SODIUM SCH MG: 100 CAPSULE, LIQUID FILLED ORAL at 07:17

## 2019-07-24 RX ADMIN — ALUMINUM HYDROXIDE, MAGNESIUM HYDROXIDE, AND SIMETHICONE PRN ML: 200; 200; 20 SUSPENSION ORAL at 19:38

## 2019-07-24 RX ADMIN — PANCRELIPASE LIPASE, PANCRELIPASE AMYLASE, AND PANCRELIPASE PROTEASE SCH EACH: 4200; 24600; 14200 CAPSULE, DELAYED RELEASE ORAL at 17:36

## 2019-07-24 RX ADMIN — PANCRELIPASE LIPASE, PANCRELIPASE AMYLASE, AND PANCRELIPASE PROTEASE SCH EACH: 4200; 24600; 14200 CAPSULE, DELAYED RELEASE ORAL at 12:23

## 2019-07-24 RX ADMIN — NICOTINE SCH PATCH: 21 PATCH, EXTENDED RELEASE TRANSDERMAL at 07:16

## 2019-07-24 RX ADMIN — NICOTINE POLACRILEX PRN LOZ: 2 LOZENGE ORAL at 11:28

## 2019-07-24 RX ADMIN — Medication SCH MG: at 20:51

## 2019-07-24 RX ADMIN — OLANZAPINE PRN MG: 5 TABLET, ORALLY DISINTEGRATING ORAL at 08:32

## 2019-07-24 RX ADMIN — OLANZAPINE SCH MG: 5 TABLET, FILM COATED ORAL at 20:53

## 2019-07-24 RX ADMIN — NICOTINE POLACRILEX PRN LOZ: 2 LOZENGE ORAL at 16:05

## 2019-07-24 RX ADMIN — DOCUSATE SODIUM SCH MG: 100 CAPSULE, LIQUID FILLED ORAL at 20:51

## 2019-07-24 RX ADMIN — LEVOTHYROXINE SODIUM SCH MCG: 88 TABLET ORAL at 07:17

## 2019-07-24 RX ADMIN — OLANZAPINE PRN MG: 5 TABLET, ORALLY DISINTEGRATING ORAL at 18:56

## 2019-07-24 RX ADMIN — DULOXETINE HYDROCHLORIDE SCH MG: 30 CAPSULE, DELAYED RELEASE ORAL at 07:18

## 2019-07-24 RX ADMIN — OLANZAPINE SCH MG: 5 TABLET, FILM COATED ORAL at 07:17

## 2019-07-24 NOTE — NUR
Nursing Progress Note



Legal hold: 5250



Client on involuntary status for DTS.



Report received from Ruby SON, with use of SBAR.



Why are they here: The patient is a 33 year old female admitted on a 5150 hold for DTS. She 
was transported by EMS from [facility] after being evaluated by [facility] and a 5150 was written. She reportedly took 20 Klonopin tabs in a 
suicide attempt. While in there ER she AWOL'd from the ER. She was in physical restraints 
for over 24 hours at one point. When asked why she was in restraints she stated "because the 
doctor was a bitch" She also required increase observation while using the bathroom because 
she was engaging in self harm behaviors. She has an open area on her left hand from 
scratching. 



Assessment



What has happened this shift: Pt resting in bed at change of shift. Pt perseverates on her 
water restriction (4 pitchers AM & 3 pitchers PM). This writer again explained to her the 
reasoning for water restriction (i.e., over-hydration & electrolyte imbalance) and how she 
is not being bullied. Pt c/o dry mouth from [place], spoke with Dr. Giordano about it and 
was recommended to use candy to suck on to help alleviate that sx. Pt also tearful about 
interaction with nursing staff in AM, believes she was blown off and ignored like I wasnt 
even there. RN reassured her that she is being heard and not ignored. Denies any SI, AH/VH. 
Reports sleeping well last night. Pt concerned about Propranolol possibly making her 
depression worse. Reports she spoke with Dr. Giordano yesterday and they plan on D/Cing it 
(titration initiated yesterday, pt currently on 10mg BID). Pt approached RN after lunch and 
reported she has had restless legs for a few days at nighttime and Im sure its from the 
medications. Dr. Giordano was notified and advised her that Cogentin will be started to 
help with that.



SI/HI: Denies SI

A/VH: Denies A/H V/H

Sleep: 7.25 hrs NOC

ADL's: Independent

Group attendance: Briefly in AM, frustrated that handouts were not enlarged and refused to 
use magnifying glass. Attended afternoon group.

Were Meds taken: Yes



Any med S/E: Pt c/o constant thirst from Lithium



Mental Status Exam



Appearance: Pt. with thick glasses, adequately groomed and dressed for environment, pt 
showered yesterday

Eye contact: mostly direct 

Behavior: Anxious, tired

Speech: Clear, normal volume and rate

Mood: Depressed, anxious

Affect: Blunted

Thought process: Goal oriented

Thought Content: c/o being bullied about water intake & staff ignoring her 

Cognition: A & O X4

Insight: Poor

Judgment: Poor 



Interventions



PRN's used: Zyprexa zydis X1, Nicotine maribell X1



Therapeutic interventions: 1:1 assessment at bedside, provided therapeutic communication, 
encouraged to go to groups, water restriction as above, coping skills, medication 
administration/education/monitoring for compliance, monitor Q15 minutes for safety. 



Restraints/seclusion/emergency medication: None.



Justification of Continued Inpatient Treatment: Pt. had a severe suicide attempt further 
medication adjustment to stabilize current crisis is needed, monitoring patient behaviors. 
Without adequate treatment for current situation, pt is at high risk for readmission if 
discharged at this time.

## 2019-07-24 NOTE — NUR
Nursing Progress Note



Legal hold: 5250



Client on involuntary status for DTS.



Report received from Ruby SON, with use of SBAR.



Why are they here: The patient is a 33 year old female admitted on a 5150 hold for DTS. She 
was transported by EMS from [facility] after being evaluated by [facility] and a 5150 was written. She reportedly took 20 Klonopin tabs in a 
suicide attempt. While in there ER she AWOL'd from the ER. She was in physical restraints 
for over 24 hours at one point. When asked why she was in restraints she stated "because the 
doctor was a bitch" She also required increase observation while using the bathroom because 
she was engaging in self harm behaviors. She has an open area on her left hand from 
scratching. 



Assessment



What has happened this shift: Pt resting in bed at change of shift. Pt perseverates on her 
water restriction (4 pitchers AM & 3 pitchers PM). This writer again explained to her the 
reasoning for water restriction (i.e., over-hydration & electrolyte imbalance) and how she 
is not being bullied. Pt c/o dry mouth from [place], spoke with Dr. Giordano about it and 
was recommended to use candy to suck on to help alleviate that sx.  Denies any SI, AH/VH. 
Reports sleeping well last night. Pt concerned about Propranolol possibly making her 
depression worse. Reports she spoke with Dr. Giordano yesterday and they plan on D/Cing it 
(titration initiated yesterday, pt currently on 10mg BID). Patient states that she thinks 
her medications are starting to help.

SI/HI: Denies SI

A/VH: Denies A/H V/H

Sleep: 7.25 hrs NOC

ADL's: Independent

Group attendance: Briefly in AM, frustrated that handouts were not enlarged and refused to 
use magnifying glass. Attended afternoon group.

Were Meds taken: Yes



Any med S/E: Pt c/o constant thirst from Lithium



Mental Status Exam



Appearance: Pt. with thick glasses, adequately groomed and dressed for environment, pt 
showered yesterday

Eye contact: mostly direct 

Behavior: Anxious, tired

Speech: Clear, normal volume and rate

Mood: Depressed, anxious

Affect: Blunted

Thought process: Goal oriented

Thought Content: c/o being bullied about water intake & staff ignoring her 

Cognition: A & O X4

Insight: Poor

Judgment: Poor 



Interventions



PRN's used: Zyprexa zydis X1, Nicotine maribell X1



Therapeutic interventions: 1:1 assessment at bedside, provided therapeutic communication, 
encouraged to go to groups, water restriction as above, coping skills, medication 
administration/education/monitoring for compliance, monitor Q15 minutes for safety. 



Restraints/seclusion/emergency medication: None.



Justification of Continued Inpatient Treatment: Pt. had a severe suicide attempt further 
medication adjustment to stabilize current crisis is needed, monitoring patient behaviors. 
Without adequate treatment for current situation, pt is at high risk for readmission if 
discharged at this time.

## 2019-07-25 VITALS — SYSTOLIC BLOOD PRESSURE: 141 MMHG | DIASTOLIC BLOOD PRESSURE: 85 MMHG

## 2019-07-25 VITALS — SYSTOLIC BLOOD PRESSURE: 121 MMHG | DIASTOLIC BLOOD PRESSURE: 77 MMHG

## 2019-07-25 RX ADMIN — NICOTINE POLACRILEX PRN LOZ: 2 LOZENGE ORAL at 14:03

## 2019-07-25 RX ADMIN — DOCUSATE SODIUM SCH MG: 100 CAPSULE, LIQUID FILLED ORAL at 20:46

## 2019-07-25 RX ADMIN — PANCRELIPASE LIPASE, PANCRELIPASE AMYLASE, AND PANCRELIPASE PROTEASE SCH EACH: 4200; 24600; 14200 CAPSULE, DELAYED RELEASE ORAL at 12:59

## 2019-07-25 RX ADMIN — LEVOTHYROXINE SODIUM SCH MCG: 88 TABLET ORAL at 07:39

## 2019-07-25 RX ADMIN — NICOTINE POLACRILEX PRN LOZ: 2 LOZENGE ORAL at 09:11

## 2019-07-25 RX ADMIN — ALUMINUM HYDROXIDE, MAGNESIUM HYDROXIDE, AND SIMETHICONE PRN ML: 200; 200; 20 SUSPENSION ORAL at 15:58

## 2019-07-25 RX ADMIN — OLANZAPINE PRN MG: 5 TABLET, ORALLY DISINTEGRATING ORAL at 19:15

## 2019-07-25 RX ADMIN — PROPRANOLOL HYDROCHLORIDE SCH MG: 10 TABLET ORAL at 20:45

## 2019-07-25 RX ADMIN — Medication SCH MG: at 20:45

## 2019-07-25 RX ADMIN — NICOTINE SCH PATCH: 21 PATCH, EXTENDED RELEASE TRANSDERMAL at 07:42

## 2019-07-25 RX ADMIN — OLANZAPINE SCH MG: 5 TABLET, FILM COATED ORAL at 07:41

## 2019-07-25 RX ADMIN — OLANZAPINE PRN MG: 5 TABLET, ORALLY DISINTEGRATING ORAL at 09:44

## 2019-07-25 RX ADMIN — PANCRELIPASE LIPASE, PANCRELIPASE AMYLASE, AND PANCRELIPASE PROTEASE SCH EACH: 4200; 24600; 14200 CAPSULE, DELAYED RELEASE ORAL at 07:40

## 2019-07-25 RX ADMIN — PROPRANOLOL HYDROCHLORIDE SCH MG: 10 TABLET ORAL at 07:40

## 2019-07-25 RX ADMIN — DOCUSATE SODIUM SCH MG: 100 CAPSULE, LIQUID FILLED ORAL at 07:40

## 2019-07-25 RX ADMIN — DULOXETINE HYDROCHLORIDE SCH MG: 30 CAPSULE, DELAYED RELEASE ORAL at 07:41

## 2019-07-25 RX ADMIN — PANCRELIPASE LIPASE, PANCRELIPASE AMYLASE, AND PANCRELIPASE PROTEASE SCH EACH: 4200; 24600; 14200 CAPSULE, DELAYED RELEASE ORAL at 17:21

## 2019-07-25 RX ADMIN — OLANZAPINE SCH MG: 5 TABLET, FILM COATED ORAL at 20:46

## 2019-07-26 VITALS — SYSTOLIC BLOOD PRESSURE: 132 MMHG | DIASTOLIC BLOOD PRESSURE: 53 MMHG

## 2019-07-26 VITALS — SYSTOLIC BLOOD PRESSURE: 136 MMHG | DIASTOLIC BLOOD PRESSURE: 91 MMHG

## 2019-07-26 RX ADMIN — LEVOTHYROXINE SODIUM SCH MCG: 88 TABLET ORAL at 06:47

## 2019-07-26 RX ADMIN — OLANZAPINE SCH MG: 5 TABLET, FILM COATED ORAL at 08:41

## 2019-07-26 RX ADMIN — OLANZAPINE SCH MG: 5 TABLET, FILM COATED ORAL at 20:05

## 2019-07-26 RX ADMIN — DOCUSATE SODIUM SCH MG: 100 CAPSULE, LIQUID FILLED ORAL at 20:05

## 2019-07-26 RX ADMIN — DULOXETINE HYDROCHLORIDE SCH MG: 30 CAPSULE, DELAYED RELEASE ORAL at 08:31

## 2019-07-26 RX ADMIN — PANCRELIPASE LIPASE, PANCRELIPASE AMYLASE, AND PANCRELIPASE PROTEASE SCH EACH: 4200; 24600; 14200 CAPSULE, DELAYED RELEASE ORAL at 06:48

## 2019-07-26 RX ADMIN — PROPRANOLOL HYDROCHLORIDE SCH MG: 10 TABLET ORAL at 08:32

## 2019-07-26 RX ADMIN — Medication SCH MG: at 20:05

## 2019-07-26 RX ADMIN — PROPRANOLOL HYDROCHLORIDE SCH MG: 10 TABLET ORAL at 20:06

## 2019-07-26 RX ADMIN — DOCUSATE SODIUM SCH MG: 100 CAPSULE, LIQUID FILLED ORAL at 08:31

## 2019-07-26 RX ADMIN — NICOTINE POLACRILEX PRN LOZ: 2 LOZENGE ORAL at 09:18

## 2019-07-26 RX ADMIN — FENTANYL TRANSDERMAL SCH PATCH: 25 PATCH, EXTENDED RELEASE TRANSDERMAL at 15:28

## 2019-07-26 RX ADMIN — PANCRELIPASE LIPASE, PANCRELIPASE AMYLASE, AND PANCRELIPASE PROTEASE SCH EACH: 4200; 24600; 14200 CAPSULE, DELAYED RELEASE ORAL at 12:47

## 2019-07-26 RX ADMIN — NICOTINE SCH PATCH: 21 PATCH, EXTENDED RELEASE TRANSDERMAL at 08:30

## 2019-07-26 RX ADMIN — OLANZAPINE PRN MG: 5 TABLET, ORALLY DISINTEGRATING ORAL at 15:45

## 2019-07-26 RX ADMIN — NICOTINE POLACRILEX PRN LOZ: 2 LOZENGE ORAL at 14:39

## 2019-07-26 RX ADMIN — PANCRELIPASE LIPASE, PANCRELIPASE AMYLASE, AND PANCRELIPASE PROTEASE SCH EACH: 4200; 24600; 14200 CAPSULE, DELAYED RELEASE ORAL at 17:33
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
## 2019-07-26 NOTE — NUR
Nursing Progress Note



Legal hold: 5250 Exp. 8/2 @ 8261



Client on involuntary status for DTS.



Report received from ADELAIDA Cao with use of SBAR.



Why are they here: The patient is a 33 year old female admitted on a 5150 hold for DTS. She 
was transported by EMS from [facility] after being evaluated by [facility] and a 5150 was written. She reportedly took 20 Klonopin tabs in a 
suicide attempt. While in there ER she AWOL'd from the ER. She was in physical restraints 
for over 24 hours at one point. When asked why she was in restraints she stated "because the 
doctor was a bitch" She also required increase observation while using the bathroom because 
she was engaging in self harm behaviors. She has an open area on her left hand from 
scratching. 



Assessment



What has happened this shift: Pt was in her room at shift change. Pt was anxious upon 
introduction. Pt states she was hearing command voices saying "I am fucked up," and "I am no 
good." Pt was using the headphones as a distraction from the voices. Pt requested her 5mg 
PRN Zyprexa with effect. Pt denies SI and continues to contact for safety.  When asked what 
was her trigger to bring her to want to harm herself, pt states it was her sister using 
mean, hatelful words to her. Pt c/o that she is having a hard time urinating and that it 
occasionally burns when she urinates. Pt also c/o 4/10 pain in her back. Pt wears a 3-day 
Fentanyl patch, which will be changed tomorrow.  Pt wanted to keep her Nicotine patch on, pt 
was educated on the possible SE it could keep her awake or cause nightmares.  Pt's Lithium 
level was 1.1 drawn on 7/21. 



SI/HI: Pt denies. None observed

A/VH: Reports AH: command "your fucked up", "your no good". Denies VH

Sleep: Currently sleeping- Scheduled Trazadone 50 mg administered. 

ADL's: Independent

Group attendance: Night shift, no group

Were Meds taken: Medication compliant

Any med S/E: Pt c/o constant thirst from Lithium



Mental Status Exam



Appearance: Pt. with thick glasses, adequately groomed and dressed for unit.

Eye contact: Intermittent 

Behavior: Cooperative, anxious

Speech: Clear, increased rate and rhythm

Mood: Depressed, anxious

Affect: Blunted

Thought process: Linear

Thought Content: Hard time urinating, some burning.

Cognition: Alert and oriented

Insight: Poor

Judgment: Poor 



Interventions



PRN's used: Zyprexa



Therapeutic interventions: 1:1 assessment at bedside, provided therapeutic communication, 
encouraged to go to groups, water restriction as above, coping skills, medication 
administration/education/monitoring for compliance, monitor Q15 minutes for safety. 



Restraints/seclusion/emergency medication: None.



Justification of Continued Inpatient Treatment: Pt. had a severe suicide attempt further 
medication adjustment to stabilize current crisis is needed, monitoring patient behaviors. 
Without adequate treatment for current situation, pt is at high risk for readmission if 
discharged at this time.

## 2019-07-27 VITALS — DIASTOLIC BLOOD PRESSURE: 86 MMHG | SYSTOLIC BLOOD PRESSURE: 135 MMHG

## 2019-07-27 VITALS — DIASTOLIC BLOOD PRESSURE: 90 MMHG | SYSTOLIC BLOOD PRESSURE: 130 MMHG

## 2019-07-27 LAB
BASOPHILS # BLD AUTO: 0 X10'3 (ref 0–0.2)
BASOPHILS NFR BLD AUTO: 0.5 % (ref 0–1)
EOSINOPHIL # BLD AUTO: 0.3 X10'3 (ref 0–0.9)
EOSINOPHIL NFR BLD AUTO: 3.3 % (ref 0–6)
ERYTHROCYTE [DISTWIDTH] IN BLOOD BY AUTOMATED COUNT: 13 % (ref 11.5–14.5)
HCT VFR BLD AUTO: 42.4 % (ref 35–45)
HGB BLD-MCNC: 14.5 G/DL (ref 12–16)
LYMPHOCYTES # BLD AUTO: 2.2 X10'3 (ref 1.1–4.8)
LYMPHOCYTES NFR BLD AUTO: 29 % (ref 21–51)
MCH RBC QN AUTO: 29.5 PG (ref 27–31)
MCHC RBC AUTO-ENTMCNC: 34.2 G/DL (ref 33–36.5)
MCV RBC AUTO: 86.1 FL (ref 78–98)
MONOCYTES # BLD AUTO: 0.4 X10'3 (ref 0–0.9)
MONOCYTES NFR BLD AUTO: 5.7 % (ref 2–12)
NEUTROPHILS # BLD AUTO: 4.7 X10'3 (ref 1.8–7.7)
NEUTROPHILS NFR BLD AUTO: 61.5 % (ref 42–75)
PLATELET # BLD AUTO: 223 X10'3 (ref 140–440)
PMV BLD AUTO: 8.4 FL (ref 7.4–10.4)
RBC # BLD AUTO: 4.93 X10'6 (ref 4.2–5.6)
WBC # BLD AUTO: 7.7 X10'3 (ref 4.5–11)

## 2019-07-27 RX ADMIN — Medication SCH MG: at 20:42

## 2019-07-27 RX ADMIN — DULOXETINE HYDROCHLORIDE SCH MG: 30 CAPSULE, DELAYED RELEASE ORAL at 07:49

## 2019-07-27 RX ADMIN — OLANZAPINE PRN MG: 5 TABLET, ORALLY DISINTEGRATING ORAL at 13:44

## 2019-07-27 RX ADMIN — PROPRANOLOL HYDROCHLORIDE SCH MG: 10 TABLET ORAL at 07:49

## 2019-07-27 RX ADMIN — NICOTINE POLACRILEX PRN LOZ: 2 LOZENGE ORAL at 18:03

## 2019-07-27 RX ADMIN — DOCUSATE SODIUM SCH MG: 100 CAPSULE, LIQUID FILLED ORAL at 20:42

## 2019-07-27 RX ADMIN — NICOTINE POLACRILEX PRN LOZ: 2 LOZENGE ORAL at 08:34

## 2019-07-27 RX ADMIN — NICOTINE SCH PATCH: 21 PATCH, EXTENDED RELEASE TRANSDERMAL at 07:48

## 2019-07-27 RX ADMIN — PANCRELIPASE LIPASE, PANCRELIPASE AMYLASE, AND PANCRELIPASE PROTEASE SCH EACH: 4200; 24600; 14200 CAPSULE, DELAYED RELEASE ORAL at 07:02

## 2019-07-27 RX ADMIN — ALUMINUM HYDROXIDE, MAGNESIUM HYDROXIDE, AND SIMETHICONE PRN ML: 200; 200; 20 SUSPENSION ORAL at 16:22

## 2019-07-27 RX ADMIN — PANCRELIPASE LIPASE, PANCRELIPASE AMYLASE, AND PANCRELIPASE PROTEASE SCH EACH: 4200; 24600; 14200 CAPSULE, DELAYED RELEASE ORAL at 17:08

## 2019-07-27 RX ADMIN — LEVOTHYROXINE SODIUM SCH MCG: 88 TABLET ORAL at 07:02

## 2019-07-27 RX ADMIN — OLANZAPINE SCH MG: 5 TABLET, FILM COATED ORAL at 07:49

## 2019-07-27 RX ADMIN — PANCRELIPASE LIPASE, PANCRELIPASE AMYLASE, AND PANCRELIPASE PROTEASE SCH EACH: 4200; 24600; 14200 CAPSULE, DELAYED RELEASE ORAL at 12:16

## 2019-07-27 RX ADMIN — NICOTINE POLACRILEX PRN LOZ: 2 LOZENGE ORAL at 14:00

## 2019-07-27 RX ADMIN — OLANZAPINE SCH MG: 5 TABLET, FILM COATED ORAL at 20:42

## 2019-07-27 RX ADMIN — DOCUSATE SODIUM SCH MG: 100 CAPSULE, LIQUID FILLED ORAL at 07:49

## 2019-07-27 RX ADMIN — PROPRANOLOL HYDROCHLORIDE SCH MG: 10 TABLET ORAL at 20:42

## 2019-07-27 NOTE — NUR
1:1 Social Work Note:



Patient asked to review her worksheets on identifying her triggers, what she still needed to 
work on whach included pt wanting to get off her pain medications. After a discussion about 
dependence/addiction athe affects on the brain and so forth, pt asked if [facility] did detox or if 
there was and inpt Tx program she could go to from here to get off her paing meds before she 
went home. This writer discussed [facility] and [facility] with her. Pt said she is very interseted in 
doing so. Suggested she speak w/ Dr. BENZ and if determines to move forward have JIMMY Sainz or 
JIMMY Moyer set up interview w/ Art from [facility].



Huyen Sandy, [facility]

## 2019-07-27 NOTE — NUR
Nursing Progress Note



Legal hold: 5250 Exp. 8/2 @ 0968



Client on involuntary status for DTS.



Report received from ADELAIDA Cao with use of SBAR.



Why are they here: The patient is a 33 year old female admitted on a 5150 hold for DTS. She 
was transported by EMS from [facility] after being evaluated by [facility] and a 5150 was written. She reportedly took 20 Klonopin tabs in a 
suicide attempt. While in there ER she AWOL'd from the ER. She was in physical restraints 
for over 24 hours at one point. When asked why she was in restraints she stated "because the 
doctor was a bitch" She also required increase observation while using the bathroom because 
she was engaging in self harm behaviors. She has an open area on her left hand from 
scratching. 



Assessment



What has happened this shift: Pt was sitting in T.V room looking out window at shift change. 
Pt c/o of feeling anxious due to another patient acting out. Pt also c/o feeling "manic." Pt 
was speaking rapidly and was jumping from thought to thought. Pt had already received 
Atarax, Ativan and Zyprexa from previous shift. Worked on deep breathing and some 
distraction techniques with patient, with little effect. Pt perseverating on why her Lithium 
was reduced and that her doctor wasn't giving her any additional PRN medications.  Pt was 
working herself up. Pt's scheduled medications were given at 2000.  Pt later requested her 
PRN Zyprexa - again reenforced CBT, which was effective. Pt currently sleeping with no acute 
distress noted. 1:1 completed a bedside. Pt kept Nicotine patch on. Fentanyl patch on right 
side CDI.





SI/HI: Pt denies. None observed

A/VH: Reports muffled voices. Denies VH

Sleep: Currently sleeping- Scheduled Trazadone 50 mg administered. 

ADL's: Independent

Group attendance: Night shift, no group

Were Meds taken: Medication compliant

Any med S/E: Thirst



Mental Status Exam



Appearance: Pt. with thick glasses, adequately groomed and dressed for unit.

Eye contact: Intermittent 

Behavior: Cooperative, anxious

Speech: Hyperverbal

Mood: Depressed, anxious

Affect: Constricted

Thought process: Racing thoughts

Thought Content: "I need something to calm down"

Cognition: Intact

Insight: Poor

Judgment: Poor 



Interventions



PRN's used: Zyprexa



Therapeutic interventions: 1:1 assessment at bedside, provided therapeutic communication, 
encouraged to go to groups, water restriction as above, coping skills, medication 
administration/education/monitoring for compliance, monitor Q15 minutes for safety. 



Restraints/seclusion/emergency medication: None.



Justification of Continued Inpatient Treatment: Pt. had a severe suicide attempt further 
medication adjustment to stabilize current crisis is needed, monitoring patient behaviors. 
Without adequate treatment for current situation, pt is at high risk for readmission if 
discharged at this time.

## 2019-07-27 NOTE — NUR
Nursing Progress Note



Legal hold: 5250 Exp. 8/2 @ 2111



Client on involuntary status for DTS.



Report received from ADELAIDA Cao with use of SBAR.



Why are they here: The patient is a 33 year old female admitted on a 5150 hold for DTS. She 
was transported by EMS from [facility] after being evaluated by [facility] and a 5150 was written. She reportedly took 20 Klonopin tabs in a 
suicide attempt. While in there ER she AWOL'd from the ER. She was in physical restraints 
for over 24 hours at one point. When asked why she was in restraints she stated "because the 
doctor was a bitch" She also required increase observation while using the bathroom because 
she was engaging in self harm behaviors. She has an open area on her left hand from 
scratching. 



Assessment



What has happened this shift: Pt visible on unit at shift change. Pt reports "feeling less 
anxious." Pt's speech is less rapid and pt appears to be less anxious then last night. Pt 
states "I knew my lithium level was low." Pt is referring to how anxious she was the night 
before. Pt's Lithium level was <0.4 and was given a NOW 300mg dose of Lithium on day shift. 
Pt retired to room to read, was up for HS snack, requested her HS medications and went to 
bed. 





SI/HI: Pt denies. None observed

A/VH: Reports muffled voices. Denies VH

Sleep: Currently sleeping- Scheduled Trazadone 50 mg administered. 

ADL's: Independent

Group attendance: Night shift, no group

Were Meds taken: Medication compliant

Any med S/E: Thirst



Mental Status Exam



Appearance: Pt. with thick glasses, adequately groomed and dressed for unit.

Eye contact: Fairs

Behavior: Cooperative, anxious

Speech: Clear, normal volume, slightly pressured at times

Mood: Depressed, anxious

Affect: Constricted

Thought process: Linear

Thought Content: "I knew my Lithium level was low"

Cognition: Intact

Insight: Fair

Judgment: Fair



Interventions



PRN's used: None



Therapeutic interventions: 1:1 assessment at bedside, provided therapeutic communication, 
encouraged to go to groups, water restriction as above, coping skills, medication 
administration/education/monitoring for compliance, monitor Q15 minutes for safety. 



Restraints/seclusion/emergency medication: None.



Justification of Continued Inpatient Treatment: Pt. had a severe suicide attempt further 
medication adjustment to stabilize current crisis is needed, monitoring patient behaviors. 
Without adequate treatment for current situation, pt is at high risk for readmission if 
discharged at this time.

## 2019-07-27 NOTE — NUR
NURSING PROGRESS NOTE



Legal hold: 5250



Client on involuntary status for DTS.



Report received from ADELAIDA Mcguire with use of SBAR.



Why are they here: The patient is a 33 year old female admitted on a 5150 hold for DTS. She 
was transported by EMS from [facility] after being evaluated by [facility] and a 5150 was written. She reportedly took 20 Klonopin tabs in a 
suicide attempt. While in there ER she AWOL'd from the ER. She was in physical restraints 
for over 24 hours at one point. When asked why she was in restraints she stated "because the 
doctor was a bitch" She also required increase observation while using the bathroom because 
she was engaging in self harm behaviors. She has an open area on her left hand from 
scratching. 



Assessment



What has happened this shift:

The patient was awake at change of shift. Depressed and anxious mood, mildly labile, 
constricted affect. Became anxious in a.m. when other patient was acting out, wanting prn's. 
Pressured speech. Lithium level draw this morning <0.4. MD increased [place] today, patient 
reports lessened anxiety later in the day when disruptive patient calmed down. Education 
regarding CBT and other coping skills was provided. Attended all groups and intent on doing 
"worksheets and homework." Patient is motivated and understands her illness. Denies suicidal 
thoughts.



SI/HI: Denies SI

A/VH: muffled AH's

Sleep: Naps at times

ADL's: Independent

Group attendance: x2

Were Meds taken: Yes

Any med S/E: thirst



Mental Status Exam



Appearance: Pt. with thick glasses, adequately groomed and dressed for environment

Eye contact: moderate

Behavior: Anxious, cooperative

Speech: Clear, normal volume and slightly pressured at times

Mood: Depressed, anxious

Affect: Constricted

Thought process: Goal oriented

Thought Content: worksheet completion "homework"

Cognition: A & O X4

Insight: Fair

Judgment: Fair



Interventions



PRN's used: Zyprexa, Tylenol,



Therapeutic interventions: 1:1 assessment at bedside, provided therapeutic communication, 
encouraged to go to groups, water restriction as above, coping skills, medication 
administration/education/monitoring for compliance, monitor Q15 minutes for safety. 



Restraints/seclusion/emergency medication: None.



Justification of Continued Inpatient Treatment: Pt. had a severe suicide attempt further 
medication adjustment to stabilize current crisis is needed, monitoring patient behaviors. 
Without adequate treatment for current situation, pt is at high risk for readmission if 
discharged at this time.

## 2019-07-28 VITALS — SYSTOLIC BLOOD PRESSURE: 133 MMHG | DIASTOLIC BLOOD PRESSURE: 93 MMHG

## 2019-07-28 VITALS — DIASTOLIC BLOOD PRESSURE: 84 MMHG | SYSTOLIC BLOOD PRESSURE: 111 MMHG

## 2019-07-28 RX ADMIN — DULOXETINE HYDROCHLORIDE SCH MG: 30 CAPSULE, DELAYED RELEASE ORAL at 07:19

## 2019-07-28 RX ADMIN — NICOTINE POLACRILEX PRN LOZ: 2 LOZENGE ORAL at 17:40

## 2019-07-28 RX ADMIN — OLANZAPINE SCH MG: 5 TABLET, FILM COATED ORAL at 07:18

## 2019-07-28 RX ADMIN — LEVOTHYROXINE SODIUM SCH MCG: 88 TABLET ORAL at 07:18

## 2019-07-28 RX ADMIN — PANCRELIPASE LIPASE, PANCRELIPASE AMYLASE, AND PANCRELIPASE PROTEASE SCH EACH: 4200; 24600; 14200 CAPSULE, DELAYED RELEASE ORAL at 07:19

## 2019-07-28 RX ADMIN — OLANZAPINE PRN MG: 5 TABLET, ORALLY DISINTEGRATING ORAL at 12:39

## 2019-07-28 RX ADMIN — DOCUSATE SODIUM SCH MG: 100 CAPSULE, LIQUID FILLED ORAL at 20:38

## 2019-07-28 RX ADMIN — PROPRANOLOL HYDROCHLORIDE SCH MG: 10 TABLET ORAL at 20:39

## 2019-07-28 RX ADMIN — ALUMINUM HYDROXIDE, MAGNESIUM HYDROXIDE, AND SIMETHICONE PRN ML: 200; 200; 20 SUSPENSION ORAL at 18:56

## 2019-07-28 RX ADMIN — NICOTINE SCH PATCH: 21 PATCH, EXTENDED RELEASE TRANSDERMAL at 07:23

## 2019-07-28 RX ADMIN — PANCRELIPASE LIPASE, PANCRELIPASE AMYLASE, AND PANCRELIPASE PROTEASE SCH EACH: 4200; 24600; 14200 CAPSULE, DELAYED RELEASE ORAL at 16:56

## 2019-07-28 RX ADMIN — PANCRELIPASE LIPASE, PANCRELIPASE AMYLASE, AND PANCRELIPASE PROTEASE SCH EACH: 4200; 24600; 14200 CAPSULE, DELAYED RELEASE ORAL at 12:39

## 2019-07-28 RX ADMIN — DOCUSATE SODIUM SCH MG: 100 CAPSULE, LIQUID FILLED ORAL at 07:19

## 2019-07-28 RX ADMIN — PROPRANOLOL HYDROCHLORIDE SCH MG: 10 TABLET ORAL at 07:19

## 2019-07-28 RX ADMIN — NICOTINE POLACRILEX PRN LOZ: 2 LOZENGE ORAL at 09:01

## 2019-07-28 RX ADMIN — OLANZAPINE SCH MG: 5 TABLET, FILM COATED ORAL at 20:41

## 2019-07-28 RX ADMIN — Medication SCH MG: at 20:39

## 2019-07-28 NOTE — NUR
Nursing Progress Note: 



Legal hold: 5250 Exp. 8/2 @ 7645



Client on involuntary status for DTS.



Report received from ADELAIDA Cao with use of SBAR.



Why are they here: The patient is a 33 year old female admitted on a 5150 hold for DTS. She 
was transported by EMS from [facility] after being evaluated by [facility] and a 5150 was written. She reportedly took 20 Klonopin tabs in a 
suicide attempt. While in there ER she AWOL'd from the ER. She was in physical restraints 
for over 24 hours at one point. When asked why she was in restraints she stated "because the 
doctor was a bitch" She also required increase observation while using the bathroom because 
she was engaging in self harm behaviors. She has an open area on her left hand from 
scratching. 



Assessment



What has happened this shift: Pt. was in bed reading at change off shift. She got up around 
snack time and ate snack in group room with her peers. Pt states her depression and anxiety 
is 7of 10. Pt was upset and crying about being on a water restriction. Pt requested a Prn 
Maalox fo GI discomfort which was helpful.



SI/HI: states continues to have a plan but would not elaborate

A/VH: Denies. 

Sleep: 7.25 

ADL's: Independent

Group attendance: yes, requests that handouts be a minimum of 22 point font.

Were Meds taken: Medication compliant

Any med S/E: Thirst



Mental Status Exam



Appearance: Pt. with thick glasses, adequately groomed and dressed for unit.

Eye contact: Fairs

Behavior: Cooperative, anxious

Speech: Clear, normal volume, slightly pressured at times

Mood: Depressed, anxious

Affect: Constricted

Thought process: Linear

Thought Content: focused on managing water intake

Cognition: Intact

Insight: Fair

Judgment: Fair



Interventions



PRN's used: Maalox



Therapeutic interventions: 1:1 assessment at bedside, provided therapeutic communication, 
encouraged to go to groups, water restriction as above, coping skills, medication 
administration/education/monitoring for compliance, monitor Q15 minutes for safety. 



Restraints/seclusion/emergency medication: None.



Justification of Continued Inpatient Treatment: Pt. had a severe suicide attempt further 
medication adjustment to stabilize current crisis is needed, monitoring patient behaviors. 
Without adequate treatment for current situation, pt is at high risk for readmission if 
discharged at this time.

## 2019-07-28 NOTE — NUR
Nursing Progress Note: 



Legal hold: 5250 Exp. 8/2 @ 2117



Client on involuntary status for DTS.



Report received from ADELAIDA Moss with use of SBAR.



Why are they here: The patient is a 33 year old female admitted on a 5150 hold for DTS. She 
was transported by EMS from [facility] after being evaluated by [facility] and a 5150 was written. She reportedly took 20 Klonopin tabs in a 
suicide attempt. While in there ER she AWOL'd from the ER. She was in physical restraints 
for over 24 hours at one point. When asked why she was in restraints she stated "because the 
doctor was a bitch" She also required increase observation while using the bathroom because 
she was engaging in self harm behaviors. She has an open area on her left hand from 
scratching. 



Assessment



What has happened this shift: Pt. Had just returned from shower and was in her room. Upon 
introduction, patient was pleasant, rating her depression and anxiety at a 7-8/10. Again 
requested a 5X-6X magnifying glass to assist with vision. She had requested her 3rd pitcher 
of water for the shift at 1100. When she again asked for more water, this writer explained 
that it would be the last one for the day. Patient became very angry, anxious and tearful. 
She explained to this writer that I was accusatory and increased her anxiety to over a 10 on 
a 0-10 scale. A friend provider her with a 5X magnifying glass which she seemed thankful 
for. Patient met with Dr. BENZ and nurse to discuss her frustration with water limit. Pt. 
Continued to demonstrate increased anxiety related to being told she had drank 3 pitchers 
before 1200



SI/HI: states continues to have a plan but would not elaborate

A/VH: Denies. 

Sleep: 7.25 

ADL's: Independent

Group attendance: yes, requests that handouts be a minimum of 22 point font.

Were Meds taken: Medication compliant

Any med S/E: Thirst



Mental Status Exam



Appearance: Pt. with thick glasses, adequately groomed and dressed for unit.

Eye contact: Fairs

Behavior: Cooperative, anxious

Speech: Clear, normal volume, slightly pressured at times

Mood: Depressed, anxious

Affect: Constricted

Thought process: Linear

Thought Content: focused on managing water intake

Cognition: Intact

Insight: Fair

Judgment: Fair



Interventions



PRN's used: Olazapine



Therapeutic interventions: 1:1 assessment at bedside, provided therapeutic communication, 
encouraged to go to groups, water restriction as above, coping skills, medication 
administration/education/monitoring for compliance, monitor Q15 minutes for safety. 



Restraints/seclusion/emergency medication: None.



Justification of Continued Inpatient Treatment: Pt. had a severe suicide attempt further 
medication adjustment to stabilize current crisis is needed, monitoring patient behaviors. 
Without adequate treatment for current situation, pt is at high risk for readmission if 
discharged at this time.

## 2019-07-29 VITALS — SYSTOLIC BLOOD PRESSURE: 131 MMHG | DIASTOLIC BLOOD PRESSURE: 87 MMHG

## 2019-07-29 VITALS — SYSTOLIC BLOOD PRESSURE: 125 MMHG | DIASTOLIC BLOOD PRESSURE: 79 MMHG

## 2019-07-29 RX ADMIN — OLANZAPINE PRN MG: 5 TABLET, ORALLY DISINTEGRATING ORAL at 17:07

## 2019-07-29 RX ADMIN — DOCUSATE SODIUM SCH MG: 100 CAPSULE, LIQUID FILLED ORAL at 07:21

## 2019-07-29 RX ADMIN — NICOTINE SCH PATCH: 21 PATCH, EXTENDED RELEASE TRANSDERMAL at 07:25

## 2019-07-29 RX ADMIN — OLANZAPINE PRN MG: 5 TABLET, ORALLY DISINTEGRATING ORAL at 10:46

## 2019-07-29 RX ADMIN — DOCUSATE SODIUM SCH MG: 100 CAPSULE, LIQUID FILLED ORAL at 20:47

## 2019-07-29 RX ADMIN — LEVOTHYROXINE SODIUM SCH MCG: 88 TABLET ORAL at 07:21

## 2019-07-29 RX ADMIN — NICOTINE POLACRILEX PRN LOZ: 2 LOZENGE ORAL at 17:08

## 2019-07-29 RX ADMIN — PROPRANOLOL HYDROCHLORIDE SCH MG: 10 TABLET ORAL at 07:22

## 2019-07-29 RX ADMIN — OLANZAPINE SCH MG: 5 TABLET, FILM COATED ORAL at 20:48

## 2019-07-29 RX ADMIN — PROPRANOLOL HYDROCHLORIDE SCH MG: 10 TABLET ORAL at 20:47

## 2019-07-29 RX ADMIN — NICOTINE POLACRILEX PRN LOZ: 2 LOZENGE ORAL at 12:50

## 2019-07-29 RX ADMIN — PANCRELIPASE LIPASE, PANCRELIPASE AMYLASE, AND PANCRELIPASE PROTEASE SCH EACH: 4200; 24600; 14200 CAPSULE, DELAYED RELEASE ORAL at 12:50

## 2019-07-29 RX ADMIN — PANCRELIPASE LIPASE, PANCRELIPASE AMYLASE, AND PANCRELIPASE PROTEASE SCH EACH: 4200; 24600; 14200 CAPSULE, DELAYED RELEASE ORAL at 16:46

## 2019-07-29 RX ADMIN — FENTANYL TRANSDERMAL SCH PATCH: 25 PATCH, EXTENDED RELEASE TRANSDERMAL at 14:56

## 2019-07-29 RX ADMIN — ALUMINUM HYDROXIDE, MAGNESIUM HYDROXIDE, AND SIMETHICONE PRN ML: 200; 200; 20 SUSPENSION ORAL at 14:57

## 2019-07-29 RX ADMIN — OLANZAPINE SCH MG: 5 TABLET, FILM COATED ORAL at 07:23

## 2019-07-29 RX ADMIN — DULOXETINE HYDROCHLORIDE SCH MG: 30 CAPSULE, DELAYED RELEASE ORAL at 07:22

## 2019-07-29 RX ADMIN — NICOTINE POLACRILEX PRN LOZ: 2 LOZENGE ORAL at 08:41

## 2019-07-29 RX ADMIN — PANCRELIPASE LIPASE, PANCRELIPASE AMYLASE, AND PANCRELIPASE PROTEASE SCH EACH: 4200; 24600; 14200 CAPSULE, DELAYED RELEASE ORAL at 07:22

## 2019-07-29 NOTE — NUR
reassessment: Pt % regular diet meeting needs. LBM 7/28. No nutrition concerns at this 
time. Will continue to monitor.

Rec:

1. continue regular diet

2. wt per rx

-------------------------------------------------------------------------------

Addendum: 07/29/19 at 1100 by Juan Lagunas RD

-------------------------------------------------------------------------------

Amended: Links added.

## 2019-07-29 NOTE — NUR
Nursing Progress Note: 



Legal hold: 5250 Exp. 8/2 @ 9762



Client on involuntary status for DTS.



Report received from ADELAIDA Cao with use of SBAR.



Why are they here: The patient is a 33 year old female admitted on a 5150 hold for DTS. She 
was transported by EMS from [facility] after being evaluated by [facility] and a 5150 was written. She reportedly took 20 Klonopin tabs in a 
suicide attempt. While in there ER she AWOL'd from the ER. She was in physical restraints 
for over 24 hours at one point. When asked why she was in restraints she stated "because the 
doctor was a bitch" She also required increase observation while using the bathroom because 
she was engaging in self harm behaviors. She has an open area on her left hand from 
scratching. 



Assessment



What has happened this shift: Patient was sleeping at change of shift.  Patient was 
frustrated because of her limit of water pitchers.    Dr BENZ placed patient back on Lithium 
this afternoon.  Patient at nurses station for information and PRN medication often.  
Patient appears very needy wanting clarification about water, medication, seeing physician.  
Patient states she is very depressed and still suicidal but won't talk about the plan.  
Patient walking the halls and appears depressed. 



SI/HI: Sucidal ideation

A/VH: Denies. 

Sleep: None during the day.

ADL's: Independent

Group attendance: yes, requests that handouts be a minimum of 22 point font.

Were Meds taken: Medication compliant

Any med S/E: Thirst



Mental Status Exam



Appearance: Pt. with thick glasses, adequately groomed and dressed for unit.

Eye contact: Fairs

Behavior: Cooperative, anxious

Speech: Clear, normal volume, slightly pressured at times

Mood: Depressed, anxious

Affect: Constricted

Thought process: Linear

Thought Content: focused on managing water intake

Cognition: Intact

Insight: Fair

Judgment: Fair



Interventions



PRN's used: Olazapine



Therapeutic interventions: 1:1 assessment at bedside, provided therapeutic communication, 
encouraged to go to groups, water restriction as above, coping skills, medication 
administration/education/monitoring for compliance, monitor Q15 minutes for safety. 



Restraints/seclusion/emergency medication: None.



Justification of Continued Inpatient Treatment: Pt. had a severe suicide attempt further 
medication adjustment to stabilize current crisis is needed, monitoring patient behaviors. 
Without adequate treatment for current situation, pt is at high risk for readmission if 
discharged at this time.

## 2019-07-29 NOTE — NUR
Nursing Progress Note: 



Legal hold: 5250 Exp. 8/2 @ 5752



Client on involuntary status for DTS.



Report received from ADELAIDA Moss with use of SBAR.



Why are they here: The patient is a 33 year old female admitted on a 5150 hold for DTS. She 
was transported by EMS from [facility] after being evaluated by [facility] and a 5150 was written. She reportedly took 20 Klonopin tabs in a 
suicide attempt. While in there ER she AWOL'd from the ER. She was in physical restraints 
for over 24 hours at one point. When asked why she was in restraints she stated "because the 
doctor was a bitch" She also required increase observation while using the bathroom because 
she was engaging in self harm behaviors. She has an open area on her left hand from 
scratching. 



Assessment



What has happened this shift: Patient was sleeping at change of shift and up before 
breakfast.  Patient was frustrated because of her limit of water pitchers.  Patient also 
upset because she is no longer on [place].  Dr BENZ placed patient back on Lithium this 
afternoon.  Patient at nurses station for information and PRN medication often.  Patient 
appears very needy wanting clarification about water, medication, seeing physician.  Patient 
states she is very depressed and still suicidal but won't talk about the plan.  Patient 
walking the halls and appears depressed. 



SI/HI: Sucidal ideation

A/VH: Denies. 

Sleep: None during the day.

ADL's: Independent

Group attendance: yes, requests that handouts be a minimum of 22 point font.

Were Meds taken: Medication compliant

Any med S/E: Thirst



Mental Status Exam



Appearance: Pt. with thick glasses, adequately groomed and dressed for unit.

Eye contact: Fairs

Behavior: Cooperative, anxious

Speech: Clear, normal volume, slightly pressured at times

Mood: Depressed, anxious

Affect: Constricted

Thought process: Linear

Thought Content: focused on managing water intake

Cognition: Intact

Insight: Fair

Judgment: Fair



Interventions



PRN's used: Olazapine



Therapeutic interventions: 1:1 assessment at bedside, provided therapeutic communication, 
encouraged to go to groups, water restriction as above, coping skills, medication 
administration/education/monitoring for compliance, monitor Q15 minutes for safety. 



Restraints/seclusion/emergency medication: None.



Justification of Continued Inpatient Treatment: Pt. had a severe suicide attempt further 
medication adjustment to stabilize current crisis is needed, monitoring patient behaviors. 
Without adequate treatment for current situation, pt is at high risk for readmission if 
discharged at this time.

## 2019-07-30 VITALS — DIASTOLIC BLOOD PRESSURE: 72 MMHG | SYSTOLIC BLOOD PRESSURE: 124 MMHG

## 2019-07-30 VITALS — SYSTOLIC BLOOD PRESSURE: 114 MMHG | DIASTOLIC BLOOD PRESSURE: 57 MMHG

## 2019-07-30 RX ADMIN — MAGNESIUM HYDROXIDE PRN ML: 400 SUSPENSION ORAL at 10:38

## 2019-07-30 RX ADMIN — DOCUSATE SODIUM SCH MG: 100 CAPSULE, LIQUID FILLED ORAL at 07:43

## 2019-07-30 RX ADMIN — OLANZAPINE SCH MG: 5 TABLET, FILM COATED ORAL at 07:49

## 2019-07-30 RX ADMIN — PANCRELIPASE LIPASE, PANCRELIPASE AMYLASE, AND PANCRELIPASE PROTEASE SCH EACH: 4200; 24600; 14200 CAPSULE, DELAYED RELEASE ORAL at 17:29

## 2019-07-30 RX ADMIN — LITHIUM CARBONATE SCH MG: 300 TABLET, FILM COATED, EXTENDED RELEASE ORAL at 20:30

## 2019-07-30 RX ADMIN — OLANZAPINE SCH MG: 5 TABLET, FILM COATED ORAL at 20:31

## 2019-07-30 RX ADMIN — PANCRELIPASE LIPASE, PANCRELIPASE AMYLASE, AND PANCRELIPASE PROTEASE SCH EACH: 4200; 24600; 14200 CAPSULE, DELAYED RELEASE ORAL at 07:43

## 2019-07-30 RX ADMIN — Medication SCH MG: at 20:28

## 2019-07-30 RX ADMIN — NICOTINE POLACRILEX PRN LOZ: 2 LOZENGE ORAL at 13:19

## 2019-07-30 RX ADMIN — LEVOTHYROXINE SODIUM SCH MCG: 88 TABLET ORAL at 07:45

## 2019-07-30 RX ADMIN — PROPRANOLOL HYDROCHLORIDE SCH MG: 10 TABLET ORAL at 07:45

## 2019-07-30 RX ADMIN — NICOTINE SCH PATCH: 21 PATCH, EXTENDED RELEASE TRANSDERMAL at 07:54

## 2019-07-30 RX ADMIN — NICOTINE POLACRILEX PRN LOZ: 2 LOZENGE ORAL at 19:28

## 2019-07-30 RX ADMIN — DULOXETINE HYDROCHLORIDE SCH MG: 30 CAPSULE, DELAYED RELEASE ORAL at 07:49

## 2019-07-30 RX ADMIN — NICOTINE POLACRILEX PRN LOZ: 2 LOZENGE ORAL at 08:39

## 2019-07-30 RX ADMIN — DOCUSATE SODIUM SCH MG: 100 CAPSULE, LIQUID FILLED ORAL at 20:30

## 2019-07-30 RX ADMIN — OLANZAPINE PRN MG: 5 TABLET, ORALLY DISINTEGRATING ORAL at 13:19

## 2019-07-30 RX ADMIN — PANCRELIPASE LIPASE, PANCRELIPASE AMYLASE, AND PANCRELIPASE PROTEASE SCH EACH: 4200; 24600; 14200 CAPSULE, DELAYED RELEASE ORAL at 13:01

## 2019-07-30 NOTE — NUR
Nursing Progress Note: 



Legal hold: 5250 Exp. 8/2 @ 9433



Client on involuntary status for DTS.



Report received from ADELAIDA Pearson with use of SBAR.



Why are they here: The patient is a 33 year old female admitted on a 5150 hold for DTS. She 
was transported by EMS from [facility] after being evaluated by [facility] and a 5150 was written. She reportedly took 20 Klonopin tabs in a 
suicide attempt. While in there ER she AWOL'd from the ER. She was in physical restraints 
for over 24 hours at one point. When asked why she was in restraints she stated "because the 
doctor was a bitch" She also required increase observation while using the bathroom because 
she was engaging in self harm behaviors. She has an open area on her left hand from 
scratching. 



Assessment



What has happened this shift: Patient was up and reading at shift change.  Patient is less 
depressed today and not requiring as much attention.  Patient states she is feeling better 
today though patient states she is still depressed with SI but won't talk about the plan.  
Patient is tired but brighter today and thinks her Meds are helping and feels ready to go in 
a few day.



SI/HI: Sucidal ideation

A/VH: Denies. 

Sleep: None during the day.

ADL's: Independent

Group attendance: yes,

Were Meds taken: Medication compliant

Any med S/E: Thirst



Mental Status Exam



Appearance: Pt. with thick glasses, adequately groomed and dressed for unit.

Eye contact: Fair

Behavior: Cooperative, anxious

Speech: Clear, normal volume, slightly pressured at times

Mood: Depressed, anxious

Affect: Constricted

Thought process: Linear

Thought Content: focused on managing water intake

Cognition: Intact

Insight: Fair

Judgment: Fair



Interventions



PRN's used: Olazapine, Nicotine lozenges, MOM



Therapeutic interventions: 1:1 assessment at bedside, provided therapeutic communication, 
encouraged to go to groups, water restriction as above, coping skills, medication 
administration/education/monitoring for compliance, monitor Q15 minutes for safety. 



Restraints/seclusion/emergency medication: None.



Justification of Continued Inpatient Treatment: Pt. had a severe suicide attempt further 
medication adjustment to stabilize current crisis is needed, monitoring patient behaviors. 
Without adequate treatment for current situation, pt is at high risk for readmission if 
discharged at this time.

## 2019-07-31 VITALS — SYSTOLIC BLOOD PRESSURE: 131 MMHG | DIASTOLIC BLOOD PRESSURE: 82 MMHG

## 2019-07-31 VITALS — DIASTOLIC BLOOD PRESSURE: 76 MMHG | SYSTOLIC BLOOD PRESSURE: 117 MMHG

## 2019-07-31 LAB
BACTERIA URNS QL MICRO: (no result) /HPF
CLARITY UR: (no result)
COLOR UR: (no result)
DEPRECATED SQUAMOUS URNS QL MICRO: (no result) /LPF
GLUCOSE UR STRIP-MCNC: NEGATIVE MG/DL
HGB UR QL STRIP: NEGATIVE
KETONES UR STRIP-MCNC: NEGATIVE MG/DL
LEUKOCYTE ESTERASE UR QL STRIP: (no result)
NITRITE UR QL STRIP: NEGATIVE
PH UR STRIP: 6.5 [PH] (ref 4.8–8)
PROT UR QL STRIP: NEGATIVE MG/DL
RBC #/AREA URNS HPF: (no result) /HPF (ref 0–2)
SP GR UR STRIP: <=1.005 (ref 1–1.03)
URN COLLECT METHOD CLASS: (no result)
UROBILINOGEN UR STRIP-MCNC: 0.2 E.U/DL (ref 0.2–1)
WBC #/AREA URNS HPF: (no result) /HPF (ref 0–4)

## 2019-07-31 RX ADMIN — NICOTINE POLACRILEX PRN LOZ: 2 LOZENGE ORAL at 12:54

## 2019-07-31 RX ADMIN — NICOTINE SCH PATCH: 21 PATCH, EXTENDED RELEASE TRANSDERMAL at 07:46

## 2019-07-31 RX ADMIN — OLANZAPINE SCH MG: 5 TABLET, FILM COATED ORAL at 20:22

## 2019-07-31 RX ADMIN — NICOTINE POLACRILEX PRN LOZ: 2 LOZENGE ORAL at 08:31

## 2019-07-31 RX ADMIN — OLANZAPINE PRN MG: 5 TABLET, ORALLY DISINTEGRATING ORAL at 09:57

## 2019-07-31 RX ADMIN — PANCRELIPASE LIPASE, PANCRELIPASE AMYLASE, AND PANCRELIPASE PROTEASE SCH EACH: 4200; 24600; 14200 CAPSULE, DELAYED RELEASE ORAL at 07:46

## 2019-07-31 RX ADMIN — NICOTINE POLACRILEX PRN LOZ: 2 LOZENGE ORAL at 18:54

## 2019-07-31 RX ADMIN — DOCUSATE SODIUM SCH MG: 100 CAPSULE, LIQUID FILLED ORAL at 07:49

## 2019-07-31 RX ADMIN — PANCRELIPASE LIPASE, PANCRELIPASE AMYLASE, AND PANCRELIPASE PROTEASE SCH EACH: 4200; 24600; 14200 CAPSULE, DELAYED RELEASE ORAL at 12:32

## 2019-07-31 RX ADMIN — ALUMINUM HYDROXIDE, MAGNESIUM HYDROXIDE, AND SIMETHICONE PRN ML: 200; 200; 20 SUSPENSION ORAL at 15:53

## 2019-07-31 RX ADMIN — PANCRELIPASE LIPASE, PANCRELIPASE AMYLASE, AND PANCRELIPASE PROTEASE SCH EACH: 4200; 24600; 14200 CAPSULE, DELAYED RELEASE ORAL at 17:13

## 2019-07-31 RX ADMIN — PHENAZOPYRIDINE HYDROCHLORIDE SCH MG: 100 TABLET ORAL at 19:22

## 2019-07-31 RX ADMIN — DULOXETINE HYDROCHLORIDE SCH MG: 30 CAPSULE, DELAYED RELEASE ORAL at 07:48

## 2019-07-31 RX ADMIN — LITHIUM CARBONATE SCH MG: 300 TABLET, FILM COATED, EXTENDED RELEASE ORAL at 20:19

## 2019-07-31 RX ADMIN — OLANZAPINE SCH MG: 5 TABLET, FILM COATED ORAL at 07:47

## 2019-07-31 RX ADMIN — Medication SCH MG: at 20:20

## 2019-07-31 RX ADMIN — LEVOTHYROXINE SODIUM SCH MCG: 88 TABLET ORAL at 07:47

## 2019-07-31 RX ADMIN — DOCUSATE SODIUM SCH MG: 100 CAPSULE, LIQUID FILLED ORAL at 20:19

## 2019-07-31 NOTE — NUR
Nursing Progress Note: Kathleen



Legal hold: 5250 Exp. 8/2 @ 2110



Client on involuntary status for DTS.



Report received from ADELAIDA Campbell with use of SBAR.



Why are they here: The patient is a 33 year old female admitted on a 5150 hold for DTS. She 
was transported by EMS from [facility] after being evaluated by [facility] and a 5150 was written. She reportedly took 20 Klonopin tabs in a 
suicide attempt. While in there ER she AWOL'd from the ER. She was in physical restraints 
for over 24 hours at one point. When asked why she was in restraints she stated "because the 
doctor was a bitch" She also required increase observation while using the bathroom because 
she was engaging in self harm behaviors. She has an open area on her left hand from 
scratching. 



Assessment



What has happened this shift: Pt was seen by hospitalist at change of shift and pyridium was 
started as pt is experiencing some discomfort, and sometimes not complete emptying when she 
urinates. Possible uti, but ua was unremarkable per hospitalist. Pt reports she is looking 
forward to discharge tomorrow and hopes time will be earlier in the day. She states she 
would like to shave her armpits w/her morning shower tomorrow. Pt also inquires if she will 
be able to get a fentanyl patch placed before going. pt would like her meds to be placed in 
a pill reminder box with the times before she leaves because she is concerened she wont be 
able to remember to take her pills at the correct times. Pt reports some depression but 
states "Its not so bad now." , denies s/i, states she attended group today and art group was 
her favorite. Pt states she feels her meds are really working well for her. Pt reports sleep 
is ok but she had a bad dream last night. 



SI/HI: denies s/i, denies h/i 

A/VH: Denies. 

Sleep: well despite having a 'bad dream' the night before. 

ADL's: Independent

Group attendance: yes

Were Meds taken: Medication compliant

Any med S/E: none reported/observed 



Mental Status Exam



Appearance: Pt. with thick glasses, adequately groomed and dressed for unit.

Eye contact: good

Behavior: Cooperative, anxious

Speech: Clear, normal volume, slightly pressured at times

Mood: Depressed, anxious

Affect: Constricted

Thought process: Linear

Thought Content: focused on getting prepared for discharge tomorrow, and would like help 
going over times medications or due preferring a pill reminder be set up for her. 

Cognition: Intact

Insight: Fair

Judgment: Fair



Interventions



PRN's used: Nicotine lozenge



Therapeutic interventions: 1:1 assessment at bedside, provided therapeutic communication, 
encouraged to go to groups, water restriction as above, coping skills, medication 
administration/education/monitoring for compliance, monitor Q15 minutes for safety. 



Restraints/seclusion/emergency medication: None.



Justification of Continued Inpatient Treatment: Pt. had a severe suicide attempt further 
medication adjustment to stabilize current crisis is needed, monitoring patient behaviors. 
Without adequate treatment for current situation, pt is at high risk for readmission if 
discharged at this time.

## 2019-07-31 NOTE — NUR
Nursing Progress Note: Kathleen



Legal hold: 5250 Exp. 8/2 @ 2111



Client on involuntary status for DTS.



Report received from ADELAIDA Martinez with use of SBAR.



Why are they here: The patient is a 33 year old female admitted on a 5150 hold for DTS. She 
was transported by EMS from [facility] after being evaluated by [facility] and a 5150 was written. She reportedly took 20 Klonopin tabs in a 
suicide attempt. While in there ER she AWOL'd from the ER. She was in physical restraints 
for over 24 hours at one point. When asked why she was in restraints she stated "because the 
doctor was a bitch" She also required increase observation while using the bathroom because 
she was engaging in self harm behaviors. She has an open area on her left hand from 
scratching. 



Assessment



What has happened this shift: Patient was up and socializing at shift change. Pt has a 
bright demeanor and is pleasant to talk to. Patient reports she is felling better but 
continues to have lingering depression sx which she hopes will be resolved once she stops 
Propranolol completely. RN advised her that she has titrated down to QD from BID.  Patient 
is tired but brighter today and thinks her Meds are helping and feels ready to D/C in a few 
days. RN assisted pt with fixing glasses. She reports anxiety after breakfast and breathing 
exercises were performed and Zyprexa zydis was requested. She also requested Nicotine maribell 
after Nicotine patch was placed. RN encouraged smoking cessation and discussed advantages of 
doing so. Pt excited/anxious to quit. Pt reports trouble urinating and reports that she does 
not experience freq, but does have burning and trouble initiating. Dr Giordano was 
consulted and order given for UA. No other concerns voiced at this time.



SI/HI: SI, no plan/intent voiced

A/VH: Denies. 

Sleep: 7.0 hrs NOC

ADL's: Independent

Group attendance: yes

Were Meds taken: Medication compliant

Any med S/E: Thirst



Mental Status Exam



Appearance: Pt. with thick glasses, adequately groomed and dressed for unit.

Eye contact: Fair

Behavior: Cooperative, anxious

Speech: Clear, normal volume, slightly pressured at times

Mood: Depressed, anxious

Affect: Constricted

Thought process: Linear

Thought Content: focused on fixing glasses

Cognition: Intact

Insight: Fair

Judgment: Fair



Interventions



PRN's used: Zyprexa Zydis X1, Nicotine lozenges X2, Maalox X1



Therapeutic interventions: 1:1 assessment at bedside, provided therapeutic communication, 
encouraged to go to groups, water restriction as above, coping skills, medication 
administration/education/monitoring for compliance, monitor Q15 minutes for safety. 



Restraints/seclusion/emergency medication: None.



Justification of Continued Inpatient Treatment: Pt. had a severe suicide attempt further 
medication adjustment to stabilize current crisis is needed, monitoring patient behaviors. 
Without adequate treatment for current situation, pt is at high risk for readmission if 
discharged at this time.

## 2019-08-01 VITALS — SYSTOLIC BLOOD PRESSURE: 122 MMHG | DIASTOLIC BLOOD PRESSURE: 82 MMHG

## 2019-08-01 RX ADMIN — NICOTINE SCH PATCH: 21 PATCH, EXTENDED RELEASE TRANSDERMAL at 07:43

## 2019-08-01 RX ADMIN — PANCRELIPASE LIPASE, PANCRELIPASE AMYLASE, AND PANCRELIPASE PROTEASE SCH EACH: 4200; 24600; 14200 CAPSULE, DELAYED RELEASE ORAL at 07:43

## 2019-08-01 RX ADMIN — PHENAZOPYRIDINE HYDROCHLORIDE SCH MG: 100 TABLET ORAL at 07:43

## 2019-08-01 RX ADMIN — OLANZAPINE SCH MG: 5 TABLET, FILM COATED ORAL at 07:44

## 2019-08-01 RX ADMIN — PANCRELIPASE LIPASE, PANCRELIPASE AMYLASE, AND PANCRELIPASE PROTEASE SCH EACH: 4200; 24600; 14200 CAPSULE, DELAYED RELEASE ORAL at 12:19

## 2019-08-01 RX ADMIN — NICOTINE POLACRILEX PRN LOZ: 2 LOZENGE ORAL at 07:48

## 2019-08-01 RX ADMIN — LEVOTHYROXINE SODIUM SCH MCG: 88 TABLET ORAL at 07:43

## 2019-08-01 RX ADMIN — PHENAZOPYRIDINE HYDROCHLORIDE SCH MG: 100 TABLET ORAL at 12:19

## 2019-08-01 RX ADMIN — FENTANYL TRANSDERMAL SCH PATCH: 25 PATCH, EXTENDED RELEASE TRANSDERMAL at 12:05

## 2019-08-01 RX ADMIN — DOCUSATE SODIUM SCH MG: 100 CAPSULE, LIQUID FILLED ORAL at 07:44

## 2019-08-01 RX ADMIN — DULOXETINE HYDROCHLORIDE SCH MG: 30 CAPSULE, DELAYED RELEASE ORAL at 07:43

## 2019-08-01 RX ADMIN — OLANZAPINE PRN MG: 5 TABLET, ORALLY DISINTEGRATING ORAL at 11:58

## 2019-08-01 RX ADMIN — NICOTINE POLACRILEX PRN LOZ: 2 LOZENGE ORAL at 12:23

## 2019-08-01 NOTE — NUR
DISCHARGE PLANNING:



Patient is suppose to be picked up around noon today, 8/1/2019 but this has not been 
confirmed at this time. As there was no confirmation message from the transportation 
department this morning, this writer phoned and left message for Dr. Valdez. Transportation 
is being set up [facility],  is Lead clinician Dr. Santiago Valdez At 
[phone]. Pt is getting her medication from [facility] and transportation 
will be returning her home to her mother's residence.



CINDY Nash


-------------------------------------------------------------------------------

Addendum: 08/01/19 at 0918 by Huyen Sandy 

-------------------------------------------------------------------------------

Received confirmation that pt's transportation will be her about noon.



CINDY Nash

## 2019-08-01 NOTE — NUR
Discharge Note: Nursing Progress Note:



Legal hold: 5250 Exp. 8/2 @ 0022



Client on involuntary status for DTS.



Report received from FARSHAD Senior with use of SBAR.



Why are they here: The patient is a 33 year old female admitted on a 5150 hold for DTS. She 
was transported by EMS from [facility] [place] being evaluated by [facility] and a 5150 was written. She reportedly took 20 Klonopin tabs in a 
suicide attempt. While in there ER she AWOL'd from the ER. She was in physical restraints 
for over 24 hours at one point. When asked why she was in restraints she stated "because the 
doctor was a bitch" She also required increase observation while using the bathroom because 
she was engaging in self harm behaviors. 



Assessment



What has happened this shift: Pt denied depression, SI/HI/VH. Pt expressed some anxiety over 
discharge today. Pt denied AH this morning though stated she did have some yesterday,a male 
voice that makes derogatory statements. Pt requested that her Fentanyl patch which was due 
today at 1425 be administered before her discharge today, T.O. was obtained from Dr Giordano and her Fentanyl patch was changed around 1200. Pt's meds were called in to 
[facility]'s pharmacy, signed paper scripts for Fentanyl patch and Klonopin were sent with the 
pt. A sack lunch was provided for her. Pt was discharged at 1235, PCT ambulated pt off the 
unit and down to . Pt expressed understanding of discharge instructions including Rx's 
and follow up care.  to transport pt to sister's house in [place].



SI/HI: Pt denied

A/VH: Pt denied VH, denied AH today 

Sleep: Pt reported sleeping well, slept 8.25 hours per noc shift report.

ADL's: Independent

Group attendance: yes

Were Meds taken: yes

Any med S/E: none reported or observed. 



Mental Status Exam



Appearance: Clean, dressed in street clothes

Eye contact: good

Behavior: Pleasant, cooperative

Speech: Clear, audible

Mood: Anxious

Affect: Congruent

Thought process: Linear

Thought Content: Focused on discharge.

Cognition: A/O X 4

Insight: Fair

Judgment: Fair



Interventions



PRN's used: Nicotine lozenges, Zydis 5 mg



Therapeutic interventions: 1:1 assessment, therapeutic conversation, discharge planning and 
teaching, medication administration/monitoring/education, Q 15 min checks.



Restraints/seclusion/emergency medication: None



Justification of Continued Inpatient Treatment: N/A: pt discharged today at 1235.

## 2019-11-13 ENCOUNTER — HOSPITAL ENCOUNTER (INPATIENT)
Dept: HOSPITAL 94 - ADULT MH | Age: 34
LOS: 13 days | Discharge: HOME | DRG: 885 | End: 2019-11-26
Attending: PSYCHIATRY & NEUROLOGY | Admitting: PSYCHIATRY & NEUROLOGY
Payer: MEDICARE

## 2019-11-13 VITALS — BODY MASS INDEX: 36.98 KG/M2 | WEIGHT: 195.88 LBS | HEIGHT: 61 IN

## 2019-11-13 VITALS — SYSTOLIC BLOOD PRESSURE: 143 MMHG | DIASTOLIC BLOOD PRESSURE: 91 MMHG

## 2019-11-13 DIAGNOSIS — G43.909: ICD-10-CM

## 2019-11-13 DIAGNOSIS — Z91.14: ICD-10-CM

## 2019-11-13 DIAGNOSIS — F17.210: ICD-10-CM

## 2019-11-13 DIAGNOSIS — G25.81: ICD-10-CM

## 2019-11-13 DIAGNOSIS — H54.8: ICD-10-CM

## 2019-11-13 DIAGNOSIS — L70.9: ICD-10-CM

## 2019-11-13 DIAGNOSIS — F31.5: Primary | ICD-10-CM

## 2019-11-13 DIAGNOSIS — Z23: ICD-10-CM

## 2019-11-13 DIAGNOSIS — Z79.890: ICD-10-CM

## 2019-11-13 DIAGNOSIS — F12.90: ICD-10-CM

## 2019-11-13 DIAGNOSIS — Z79.899: ICD-10-CM

## 2019-11-13 DIAGNOSIS — B00.9: ICD-10-CM

## 2019-11-13 DIAGNOSIS — J45.909: ICD-10-CM

## 2019-11-13 DIAGNOSIS — Z81.8: ICD-10-CM

## 2019-11-13 DIAGNOSIS — M54.9: ICD-10-CM

## 2019-11-13 DIAGNOSIS — R45.851: ICD-10-CM

## 2019-11-13 DIAGNOSIS — G89.29: ICD-10-CM

## 2019-11-13 DIAGNOSIS — E03.9: ICD-10-CM

## 2019-11-13 DIAGNOSIS — K76.0: ICD-10-CM

## 2019-11-13 DIAGNOSIS — Z91.5: ICD-10-CM

## 2019-11-13 PROCEDURE — 80061 LIPID PANEL: CPT

## 2019-11-13 PROCEDURE — 83036 HEMOGLOBIN GLYCOSYLATED A1C: CPT

## 2019-11-13 PROCEDURE — 3E02340 INTRODUCTION OF INFLUENZA VACCINE INTO MUSCLE, PERCUTANEOUS APPROACH: ICD-10-PCS | Performed by: PSYCHIATRY & NEUROLOGY

## 2019-11-13 PROCEDURE — 83735 ASSAY OF MAGNESIUM: CPT

## 2019-11-13 PROCEDURE — 99285 EMERGENCY DEPT VISIT HI MDM: CPT

## 2019-11-13 PROCEDURE — 87081 CULTURE SCREEN ONLY: CPT

## 2019-11-13 PROCEDURE — 80178 ASSAY OF LITHIUM: CPT

## 2019-11-13 PROCEDURE — 80053 COMPREHEN METABOLIC PANEL: CPT

## 2019-11-13 PROCEDURE — 90732 PPSV23 VACC 2 YRS+ SUBQ/IM: CPT

## 2019-11-13 PROCEDURE — 36415 COLL VENOUS BLD VENIPUNCTURE: CPT

## 2019-11-13 PROCEDURE — 3E0234Z INTRODUCTION OF SERUM, TOXOID AND VACCINE INTO MUSCLE, PERCUTANEOUS APPROACH: ICD-10-PCS | Performed by: PSYCHIATRY & NEUROLOGY

## 2019-11-13 NOTE — NUR
Pt admitted on [facility] on 11/13/19 at 1885. She arrived on the unit disheveled in a hospital 
gown. She came from [facility] in [place] via ambulance. She was placed on a 5150 for 
DTS with attempt to OD on medications (#17 Klonopin, #3 Oxycodon, & #1 Flexeril). Pt alerted 
crisis line by saying, "See, I took 17 Klonopin!" Pt has a long history of PHF admissions. 
Hx Depression, Bipolar, Schizoaffective. 



2RN skin check was completed. Pt reports feeling "confused" and easily tearful although she 
is cognizant about what day, time, place and event is. She showered independently and 
requested that this RN get her "the good lotion that I used last time I was here." During 
1:1 assessment at bedside pt was calm and cooperative. After 20mins of interaction pt 
started clenching her fists and yelling that she needs her Fentanyl patch and that this RN 
needed to "call the doctor now!" and "You all are just going to make me suffer." Pt was not 
easily redirected as she often talks over this writer.  Pt has chronic pain (h/o back 
surgery) and reports she no longer takes Suboxone d/t SE's. Pt reports that she is estranged 
from her sister which causes her grief. She also reports living at home with family which is 
"tense." Denies any SE's from medications, none were objectively observed.

## 2019-11-14 VITALS — DIASTOLIC BLOOD PRESSURE: 79 MMHG | SYSTOLIC BLOOD PRESSURE: 128 MMHG

## 2019-11-14 RX ADMIN — DULOXETINE HYDROCHLORIDE SCH MG: 30 CAPSULE, DELAYED RELEASE ORAL at 08:40

## 2019-11-14 RX ADMIN — Medication SCH MG: at 08:39

## 2019-11-14 RX ADMIN — OLANZAPINE SCH MG: 5 TABLET, ORALLY DISINTEGRATING ORAL at 21:08

## 2019-11-14 RX ADMIN — LEVOTHYROXINE, LIOTHYRONINE SCH MG: 19; 4.5 TABLET ORAL at 08:00

## 2019-11-14 RX ADMIN — LEVOTHYROXINE SODIUM SCH MCG: 25 TABLET ORAL at 07:27

## 2019-11-14 RX ADMIN — NICOTINE POLACRILEX PRN LOZ: 2 LOZENGE ORAL at 22:14

## 2019-11-14 RX ADMIN — Medication SCH MG: at 21:09

## 2019-11-14 RX ADMIN — FENTANYL TRANSDERMAL SCH PATCH: 25 PATCH, EXTENDED RELEASE TRANSDERMAL at 09:22

## 2019-11-14 RX ADMIN — DULOXETINE HYDROCHLORIDE SCH MG: 30 CAPSULE, DELAYED RELEASE ORAL at 12:46

## 2019-11-14 RX ADMIN — NICOTINE POLACRILEX PRN LOZ: 2 LOZENGE ORAL at 19:08

## 2019-11-14 NOTE — NUR
Nursing Progress Note



Legal hold: 5150



Client on involuntary status for DTS



Report received from ADELAIDA Beltran with use of SBAR



Why are they here: Patient OD on Klonopin, OXY and Flexeril.







Assessment



What has happened this shift: 

The patient has been tearful and demanding all shift.  Labile and splitting staff.  States 
that people are "watching her" and using "listening devices." She states she is hearing 
voices.  Intrusive, asking any doctor at anytime to solve her problems. Wanting instant 
gratification and has no coping skills. Frantic at times.  Argumentative with staff. 
Attention seeking.

  

S/I, H/I: positive SI, wants to cut with a broken mirror or glass.

A/VH:  AH

Sleep: None

ADL's: Self

Group attendance: Yes

Were meds taken: Yes

Any med S/E: None





Mental Status Exam



Appearance: disheveled 

Eye contact: direct

Behavior: Labile

Speech: pressured

Mood: Depressed

Affect: Agitated

Thought process:  

Thought Content: 

Cognition:alert

Insight:poor

Judgment:poor 







Interventions



PRN's used: zyprexa



Therapeutic interventions: establish rapport, active listening, maintain therapeutic 
environment, q15m safety checks



Restraints/seclusion/emergency medication: None





Justification of Continued Inpatient Treatment: Requires interruption of current crisis, 
medication adjustments, and a safe and supportive environment to prevent readmission.

## 2019-11-14 NOTE — NUR
Nursing Note: Pt. tearful, anxious, and agitated; c/o chronic back pain, PRN Tylenol and 
Atrax administered. Pt. sitting in bed at this time, will continue to monitor.
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
## 2019-11-15 VITALS — SYSTOLIC BLOOD PRESSURE: 140 MMHG | DIASTOLIC BLOOD PRESSURE: 96 MMHG

## 2019-11-15 VITALS — SYSTOLIC BLOOD PRESSURE: 126 MMHG | DIASTOLIC BLOOD PRESSURE: 80 MMHG

## 2019-11-15 LAB
ALBUMIN SERPL BCP-MCNC: 4.2 G/DL (ref 3.4–5)
ALBUMIN/GLOB SERPL: 1.1 {RATIO} (ref 1.1–1.5)
ALP SERPL-CCNC: 81 IU/L (ref 46–116)
ALT SERPL W P-5'-P-CCNC: 49 U/L (ref 12–78)
ANION GAP SERPL CALCULATED.3IONS-SCNC: 13 MMOL/L (ref 8–16)
AST SERPL W P-5'-P-CCNC: 40 U/L (ref 10–37)
BILIRUB SERPL-MCNC: 0.3 MG/DL (ref 0.1–1)
BUN SERPL-MCNC: 11 MG/DL (ref 7–18)
BUN/CREAT SERPL: 10.7 (ref 6.6–38)
CALCIUM SERPL-MCNC: 9.1 MG/DL (ref 8.5–10.1)
CHLORIDE SERPL-SCNC: 104 MMOL/L (ref 99–107)
CHOLEST SERPL-MCNC: 177 MG/DL (ref 0–200)
CHOLEST/HDLC SERPL: 3.8 {RATIO} (ref 0–4.99)
CO2 SERPL-SCNC: 23.5 MMOL/L (ref 24–32)
CREAT SERPL-MCNC: 1.03 MG/DL (ref 0.4–0.9)
GFR SERPL CREATININE-BSD FRML MDRD: 61 ML/MIN
GLUCOSE SERPL-MCNC: 102 MG/DL (ref 70–104)
HBA1C MFR BLD: 5.1 % (ref 4.5–6.2)
HDLC SERPL-MCNC: 47 MG/DL (ref 35–60)
LDLC SERPL DIRECT ASSAY-MCNC: 119 MG/DL (ref 50–100)
POTASSIUM SERPL-SCNC: 3.8 MMOL/L (ref 3.5–5.1)
PROT SERPL-MCNC: 8 G/DL (ref 6.4–8.2)
SODIUM SERPL-SCNC: 140 MMOL/L (ref 135–145)
TRIGL SERPL-MCNC: 148 MG/DL (ref 20–135)

## 2019-11-15 RX ADMIN — NICOTINE POLACRILEX PRN LOZ: 2 LOZENGE ORAL at 17:38

## 2019-11-15 RX ADMIN — Medication SCH MG: at 07:31

## 2019-11-15 RX ADMIN — NICOTINE SCH PATCH: 21 PATCH, EXTENDED RELEASE TRANSDERMAL at 08:04

## 2019-11-15 RX ADMIN — Medication SCH MG: at 20:39

## 2019-11-15 RX ADMIN — OLANZAPINE SCH MG: 5 TABLET, ORALLY DISINTEGRATING ORAL at 07:32

## 2019-11-15 RX ADMIN — LEVOTHYROXINE SODIUM SCH MCG: 25 TABLET ORAL at 07:23

## 2019-11-15 RX ADMIN — DULOXETINE HYDROCHLORIDE SCH MG: 30 CAPSULE, DELAYED RELEASE ORAL at 07:32

## 2019-11-15 RX ADMIN — LEVOTHYROXINE, LIOTHYRONINE SCH MG: 19; 4.5 TABLET ORAL at 07:31

## 2019-11-15 RX ADMIN — NICOTINE POLACRILEX PRN LOZ: 2 LOZENGE ORAL at 15:06

## 2019-11-15 RX ADMIN — DULOXETINE HYDROCHLORIDE SCH MG: 30 CAPSULE, DELAYED RELEASE ORAL at 13:09

## 2019-11-15 NOTE — NUR
Attempted to reach Ct's sister, Karyna (ph# [phone]) as she called earlier and spoke to 
ADELAIDA Ram, who suggested writer call her.



ANTONIA Dick

## 2019-11-15 NOTE — NUR
Nursing Progress Note



Legal hold: 5150



Client on involuntary status for DTS



Report received from ADELAIDA Moss with use of SBAR



Why are they here: 

The patient stopped her medications, decompensated and became suicidal, and attempted 
suicide by taking Klonopin, OXY and Flexeril.







Assessment



What has happened this shift: 

The patient was asleep at change of shift. She was up to breakfast with her peers. Slightly 
calmer demeanor today. Still questioning meds. States she is receiving messages from the [employer] 
and a code that only she can understand which will save the world. When she reported she was 
"very anxious" and asked for an Ativan she was asked what other coping skills she has and 
was able to say journaling and deep breathing. She was asked to go with the group out onto 
the patio and practice deep breathing and get some sunshine which she did. It was pointed 
out to the patient that she does in fact have good coping skills and that she is less 
anxious when interacting with her peers and having distractions. She was able to realize 
this was true. Reassurance provided.    



S/I, H/I: passive

A/VH:  AH

Sleep: None

ADL's: Self

Group attendance: yes

Were meds taken: Yes

Any med S/E: None





Mental Status Exam



Appearance: neat, wearing heavy sweater 

Eye contact: direct

Behavior: Labile

Speech: pressured

Mood: Depressed, teary

Affect: Agitated, paranoid

Thought process:  Delusional

Thought Content: Feelings of anxiety

Cognition:alert

Insight:poor

Judgment:poor 







Interventions



PRN's used: Ativan



Therapeutic interventions: establish rapport, active listening, maintain therapeutic 
environment, q15m safety checks, medication administration 



Restraints/seclusion/emergency medication: None





Justification of Continued Inpatient Treatment: Requires interruption of current crisis, 
medication adjustments, and a safe and supportive environment to prevent readmission.

## 2019-11-15 NOTE — NUR
RN received message from laboratory regarding pt.'s positive MRSA nare swab. RN informed 
pt.'s RN Yo to inform pt. of proper hand washing technique.

## 2019-11-15 NOTE — NUR
Nursing Progress Note



Legal hold: 5150



Client on involuntary status for DTS



Report received from ADELAIDA Cao with use of SBAR



Why are they here: Patient OD on Klonopin, OXY and Flexeril.







Assessment



What has happened this shift: 

Pt was observed walking down the halls during shift change.  This writer introduce self to 
pt and established rapport.  Cooperative during physical assessment but is questioning 
almost every medication given.  She states that she had talked to the doctor and they agreed 
that she was going to get 10mg of Zyprexa.  Verified  notes and it only shows 5mg.  Also 
wants to know why there is no order for lithium.  This writer contacted dr. Kern and he 
order labs for it and also put in an order for 300mg Lithium TID.  Pt is not happy with care 
and request to page .  She wants to be the first person to be seen by  tomorrow.  She 
states that "as long as my medications are not right, I am never going to feel better".  Pt 
becomes agitated and teary.  Requests something for sleep and Trazodone and Atarax is given 
but is still up on and off for most of the night.  Pt is very intrusive and attention 
seeking.  



S/I, H/I: positive SI, wants to cut with a broken mirror or glass.

A/VH:  AH

Sleep: See sleep assessment 

ADL's: Self

Group attendance: None during night shift

Were meds taken: Yes

Any med S/E: None





Mental Status Exam



Appearance: disheveled, wearing dirty scrubs 

Eye contact: direct

Behavior: Labile

Speech: pressured

Mood: Depressed, teary

Affect: Agitated, paranoid

Thought process:  Disorganized 

Thought Content: Focused on medications not being correct

Cognition:alert

Insight:poor

Judgment:poor 







Interventions



PRN's used: Trazodone, Nicotine lozenge, Atarax, Exedrin, Ativan



Therapeutic interventions: establish rapport, active listening, maintain therapeutic 
environment, q15m safety checks, medication administration 



Restraints/seclusion/emergency medication: None





Justification of Continued Inpatient Treatment: Requires interruption of current crisis, 
medication adjustments, and a safe and supportive environment to prevent readmission.

## 2019-11-16 VITALS — DIASTOLIC BLOOD PRESSURE: 91 MMHG | SYSTOLIC BLOOD PRESSURE: 133 MMHG

## 2019-11-16 VITALS — SYSTOLIC BLOOD PRESSURE: 143 MMHG | DIASTOLIC BLOOD PRESSURE: 87 MMHG

## 2019-11-16 RX ADMIN — DULOXETINE HYDROCHLORIDE SCH MG: 30 CAPSULE, DELAYED RELEASE ORAL at 08:26

## 2019-11-16 RX ADMIN — NICOTINE POLACRILEX PRN LOZ: 2 LOZENGE ORAL at 20:11

## 2019-11-16 RX ADMIN — NICOTINE POLACRILEX PRN LOZ: 2 LOZENGE ORAL at 22:03

## 2019-11-16 RX ADMIN — Medication SCH MG: at 20:09

## 2019-11-16 RX ADMIN — DULOXETINE HYDROCHLORIDE SCH MG: 30 CAPSULE, DELAYED RELEASE ORAL at 12:22

## 2019-11-16 RX ADMIN — LEVOTHYROXINE SODIUM SCH MCG: 25 TABLET ORAL at 07:18

## 2019-11-16 RX ADMIN — NICOTINE SCH PATCH: 21 PATCH, EXTENDED RELEASE TRANSDERMAL at 08:28

## 2019-11-16 RX ADMIN — NICOTINE POLACRILEX PRN LOZ: 2 LOZENGE ORAL at 13:25

## 2019-11-16 RX ADMIN — LEVOTHYROXINE, LIOTHYRONINE SCH MG: 19; 4.5 TABLET ORAL at 07:18

## 2019-11-16 RX ADMIN — Medication SCH MG: at 08:26

## 2019-11-16 RX ADMIN — NICOTINE POLACRILEX PRN LOZ: 2 LOZENGE ORAL at 17:20

## 2019-11-16 RX ADMIN — NICOTINE POLACRILEX PRN LOZ: 2 LOZENGE ORAL at 05:48

## 2019-11-16 NOTE — NUR
Nursing Progress Note



Legal hold: 5150



Client on involuntary status for DTS



Report received from ADELAIDA Moss with use of SBAR



Why are they here: 

The patient stopped her medications, decompensated and became suicidal, and attempted 
suicide by taking Klonopin, OXY and Flexeril.







Assessment



What has happened this shift: 

The patient was asleep at change of shift. she was up to breakfast and med compliant. 
Intrusive at times and needy. Mid morning reported increased suicidal thoughts. Stated "I 
don't have anyone to ever visit me." Tearful. Reported wanting to break the mirror or the 
toilet paper armstrong so she can use it to cut herself. Reassurance offered with review of 
coping skills. Agreed to stop and think and report to staff when feeling overwhelmed. PRN 
administered. Patient's roommate was very hostile towards her today yelling at her and 
calling her names, accusing her of stealing her clothes and wanting her to get out of the 
bathroom. Decision was made to place patient in a quieter room.   



S/I, H/I:  SI

A/VH:  AH

Sleep: None

ADL's: Self

Group attendance: yes

Were meds taken: Yes

Any med S/E: None





Mental Status Exam



Appearance: neat, wearing heavy sweater 

Eye contact: direct

Behavior: Labile

Speech: pressured

Mood: Depressed, teary

Affect: Agitated, paranoid

Thought process:  Delusional

Thought Content: Feelings of anxiety

Cognition:alert

Insight:poor

Judgment:poor 







Interventions



PRN's used: Ativan, Atarax, Jigar Irving



Therapeutic interventions: establish rapport, active listening, maintain therapeutic 
environment, q15m safety checks, medication administration 



Restraints/seclusion/emergency medication: None





Justification of Continued Inpatient Treatment: Requires interruption of current crisis, 
medication adjustments, and a safe and supportive environment to prevent readmission.

## 2019-11-16 NOTE — NUR
Nursing Progress Note



Legal hold: 5150



Client on involuntary status for DTS



Report received from ADELAIDA Cao with use of SBAR



Why are they here: 

The patient stopped her medications, decompensated and became suicidal, and attempted 
suicide by taking Klonopin, OXY and Flexeril.







Assessment



What has happened this shift: 

Pt was seen sitting in the halls during shift change.  Pt was cooperative during physical 
assessment and did not question his medications.  Paranoid, wants to be changed or have her 
roommate changed because she states that she doesn't feel safe with her.  Kept getting into 
arguments with her.  Provided reassurance and redirection.  Rates anxiety at 10.  Still  
positive for A/H.  Sates that she hears voices from a space craft sending her codes to stop 
the destruction of the earth.  Delusion of grandiose.  Believes that she was chosen because 
she is special because she is part of the [employer].  She is also hearing voices to harm self and 
states that at home she bangs her head against the wall and that has been her plan for 
suicide.  Pt refused to remove nicotine patch.  Patient was still intrusive.  Patient was 
able to remained in her room calmed for most of the night.  



S/I, H/I: passive

A/VH:  AH

Sleep: See sleep assessment

ADL's: Self

Group attendance: None during shift change

Were meds taken: Yes

Any med S/E: None





Mental Status Exam



Appearance: neat, wearing heavy sweater 

Eye contact: direct

Behavior: Labile

Speech: pressured

Mood: Depressed, teary

Affect: Agitated, paranoid

Thought process:  Delusional

Thought Content: Feelings of anxiety, medication, not feeling safe with roommate

Cognition:alert

Insight:poor

Judgment:poor 







Interventions



PRN's used: Ativan. trazodone 



Therapeutic interventions: establish rapport, active listening, maintain therapeutic 
environment, q15m safety checks, medication administration 



Restraints/seclusion/emergency medication: None





Justification of Continued Inpatient Treatment: Requires interruption of current crisis, 
medication adjustments, and a safe and supportive environment to prevent readmission.

## 2019-11-17 VITALS — DIASTOLIC BLOOD PRESSURE: 84 MMHG | SYSTOLIC BLOOD PRESSURE: 124 MMHG

## 2019-11-17 VITALS — DIASTOLIC BLOOD PRESSURE: 94 MMHG | SYSTOLIC BLOOD PRESSURE: 152 MMHG

## 2019-11-17 VITALS — SYSTOLIC BLOOD PRESSURE: 152 MMHG | DIASTOLIC BLOOD PRESSURE: 94 MMHG

## 2019-11-17 RX ADMIN — ALUMINUM HYDROXIDE, MAGNESIUM HYDROXIDE, AND SIMETHICONE PRN ML: 200; 200; 20 SUSPENSION ORAL at 17:04

## 2019-11-17 RX ADMIN — DULOXETINE HYDROCHLORIDE SCH MG: 30 CAPSULE, DELAYED RELEASE ORAL at 08:17

## 2019-11-17 RX ADMIN — NICOTINE POLACRILEX PRN LOZ: 2 LOZENGE ORAL at 15:20

## 2019-11-17 RX ADMIN — FENTANYL TRANSDERMAL SCH PATCH: 25 PATCH, EXTENDED RELEASE TRANSDERMAL at 09:51

## 2019-11-17 RX ADMIN — LEVOTHYROXINE SODIUM SCH MCG: 25 TABLET ORAL at 07:09

## 2019-11-17 RX ADMIN — NICOTINE POLACRILEX PRN LOZ: 2 LOZENGE ORAL at 21:29

## 2019-11-17 RX ADMIN — OLANZAPINE SCH MG: 5 TABLET, FILM COATED ORAL at 21:01

## 2019-11-17 RX ADMIN — LEVOTHYROXINE, LIOTHYRONINE SCH MG: 19; 4.5 TABLET ORAL at 07:09

## 2019-11-17 RX ADMIN — OLANZAPINE SCH MG: 5 TABLET, FILM COATED ORAL at 13:12

## 2019-11-17 RX ADMIN — NICOTINE POLACRILEX PRN LOZ: 2 LOZENGE ORAL at 04:39

## 2019-11-17 RX ADMIN — Medication SCH MG: at 20:59

## 2019-11-17 RX ADMIN — DULOXETINE HYDROCHLORIDE SCH MG: 30 CAPSULE, DELAYED RELEASE ORAL at 13:12

## 2019-11-17 RX ADMIN — Medication SCH MG: at 08:15

## 2019-11-17 RX ADMIN — NICOTINE SCH PATCH: 21 PATCH, EXTENDED RELEASE TRANSDERMAL at 08:15

## 2019-11-17 NOTE — NUR
Initial: Pt admit w/ depression and SI placed on 5250. PO % avg 

regular diet meeting needs. LBM 11/16.  may benefit from 

anti-hyperlipidemic per MD approval. Will continue to monitor.

Rec:

1. continue regular diet

2. anti-hyperlipidemic per MD approval

3. wt per rx

-------------------------------------------------------------------------------

Addendum: 11/17/19 at 0907 by Juan Lagunas RD

-------------------------------------------------------------------------------

Amended: Links added.

## 2019-11-17 NOTE — NUR
Nursing Progress Note



Legal hold: 5250



Client on involuntary status for DTS



Report received from ADELAIDA Cao with use of SBAR



Why are they here: 

The patient stopped her medications, decompensated and became suicidal, and attempted 
suicide by taking Klonopin, OXY and Flexeril.







Assessment



What has happened this shift: 

Pt is visible on the unit, mostly seen asking staff for things. Pt is pleasant in 
conversation but has a depressed affect. She asks writer for antibiotic ointment, writer 
inquires what for, and she shows writer that she has two open scratches on her right ankle. 
Pt admits that this was self inflicted by using her fingernails to scratch herself "a day or 
two ago." She says that she had told a staff member yesterday, but there is no documentation 
of this. Pictures were taken, antibiotic ointment and bandage applied. Pt said she did this 
because she was feeling overwhelmed and stressed about" everything." She continues to 
indorse suicidal ideation. 



Writer talked to pt about coping mechanisms and coming to staff when she feels like wanting 
to hurt herself or if she is feeling overwhelmed. Pt verbalized that she would talk to staff 
if she felt like she wanted to hurt herself. Pt was given prn ativan. 





S/I, H/I: endorses SI

A/VH:  does not endorse AH/VH this shift

Sleep: See sleep assessment

ADL's: Self

Group attendance: None during shift change

Were meds taken: Yes

Any med S/E: None





Mental Status Exam



Appearance: neat, wearing green scrubs

Eye contact: direct

Behavior: isolates in her room or is asking staff for things 

Speech: WNL

Mood: Depressed

Affect: depressed

Thought process:  linear this shift

Thought Content: anxiety, wanting to get sleep 

Cognition:alert

Insight:poor

Judgment:poor 







Interventions



PRN's used: Ativan, trazodone, nicotine maribell



Therapeutic interventions: establish rapport, active listening, maintain therapeutic 
environment, q15m safety checks, medication administration 



Restraints/seclusion/emergency medication: None





Justification of Continued Inpatient Treatment: Requires interruption of current crisis, 
medication adjustments, and a safe and supportive environment to prevent readmission.

## 2019-11-17 NOTE — NUR
Nursing Progress Note



Legal hold: 5150



Client on involuntary status for DTS



Report received from ADELAIDA Cao with use of SBAR



Why are they here: 

The patient stopped her medications, decompensated and became suicidal, and attempted 
suicide by taking Klonopin, OXY and Flexeril.







Assessment



What has happened this shift: 

Pt is moved to new room 327B. PT appears happy with this. "This is going to be better I 
think. I was just having trouble with my old roommate and I could never sleep." Pt is seen 
socializing on the unit and then returns to her room. She is pleasant and cooperative with 
1:1 assessment and medication compliant. She requests ativan and trazodone PRN because she 
states she is feeling anxious and knows she will need the medication to fall asleep. She 
does not make any suicidal statements this shift and remains in a fairly positive mood.

Pt received influenza and pneumococcal vaccines this evening. Pt tolerated well.





S/I, H/I: does not endorse SI this shift

A/VH:  does not endorse AH/VH this shift

Sleep: See sleep assessment

ADL's: Self

Group attendance: None during shift change

Were meds taken: Yes

Any med S/E: None





Mental Status Exam



Appearance: neat, wearing green scrubs

Eye contact: direct

Behavior: isolates in her room

Speech: WNL

Mood: Depressed

Affect: bland

Thought process:  linear this shift

Thought Content: anxiety, wanting to get sleep 

Cognition:alert

Insight:poor

Judgment:poor 







Interventions



PRN's used: Ativan. trazodone 



Therapeutic interventions: establish rapport, active listening, maintain therapeutic 
environment, q15m safety checks, medication administration 



Restraints/seclusion/emergency medication: None





Justification of Continued Inpatient Treatment: Requires interruption of current crisis, 
medication adjustments, and a safe and supportive environment to prevent readmission.

## 2019-11-18 VITALS — SYSTOLIC BLOOD PRESSURE: 138 MMHG | DIASTOLIC BLOOD PRESSURE: 90 MMHG

## 2019-11-18 VITALS — SYSTOLIC BLOOD PRESSURE: 132 MMHG | DIASTOLIC BLOOD PRESSURE: 83 MMHG

## 2019-11-18 RX ADMIN — LEVOTHYROXINE, LIOTHYRONINE SCH MG: 19; 4.5 TABLET ORAL at 07:33

## 2019-11-18 RX ADMIN — Medication SCH MG: at 20:01

## 2019-11-18 RX ADMIN — DULOXETINE HYDROCHLORIDE SCH MG: 30 CAPSULE, DELAYED RELEASE ORAL at 07:34

## 2019-11-18 RX ADMIN — DULOXETINE HYDROCHLORIDE SCH MG: 30 CAPSULE, DELAYED RELEASE ORAL at 13:38

## 2019-11-18 RX ADMIN — Medication SCH MG: at 07:33

## 2019-11-18 RX ADMIN — LEVOTHYROXINE SODIUM SCH MCG: 25 TABLET ORAL at 07:33

## 2019-11-18 RX ADMIN — NICOTINE SCH PATCH: 21 PATCH, EXTENDED RELEASE TRANSDERMAL at 07:33

## 2019-11-18 RX ADMIN — OLANZAPINE SCH MG: 5 TABLET, FILM COATED ORAL at 20:01

## 2019-11-18 RX ADMIN — OLANZAPINE SCH MG: 5 TABLET, FILM COATED ORAL at 13:38

## 2019-11-18 NOTE — NUR
Nursing Progress Note



Legal hold: [id number]



Client on involuntary status for DTS



Report received from ADELAIDA Parks with use of SBAR



Why are they here: 

The patient stopped her medications, decompensated and became suicidal, and attempted 
suicide by taking Klonopin, OXY and Flexeril.







Assessment



What has happened this shift: 

Pt up and visible on the unit. Pt continues to endorse depression and anxiety and requested 
ativan x 2 and atarax x 1 for anxiety. Pt also c/o migraine coming on and requested 
excedrine. Pt also endorses aud hallucinations telling her derogatory things. Pt spoke with 
outpt doc who directed her to change from fentanyl patches to lidocaine patches and she says 
this is what is causing her anxiety. Pt also endorses vague suicidal thoughts.





S/I, H/I:  SI

A/VH:  AH reported

Sleep: None

ADL's: Self

Group attendance: yes

Were meds taken: Yes

Any med S/E: None





Mental Status Exam



Appearance: neat, wearing heavy sweater 

Eye contact: direct

Behavior: Labile

Speech: soft and clear

Mood: Depressed, sad

Affect: Sad

Thought process:  Paranoid

Thought Content: Feelings of anxiety

Cognition:alert

Insight:poor

Judgment:poor 







Interventions



PRN's used: Tylenol Atarax, ativan, excedrine



Therapeutic interventions: establish rapport, active listening, maintain therapeutic 
environment, q15m safety checks, medication administration 



Restraints/seclusion/emergency medication: None





Justification of Continued Inpatient Treatment: Requires interruption of current crisis, 
medication adjustments, and a safe and supportive environment to prevent readmission.

## 2019-11-19 VITALS — DIASTOLIC BLOOD PRESSURE: 99 MMHG | SYSTOLIC BLOOD PRESSURE: 153 MMHG

## 2019-11-19 VITALS — DIASTOLIC BLOOD PRESSURE: 79 MMHG | SYSTOLIC BLOOD PRESSURE: 133 MMHG

## 2019-11-19 RX ADMIN — ALUMINUM HYDROXIDE, MAGNESIUM HYDROXIDE, AND SIMETHICONE PRN ML: 200; 200; 20 SUSPENSION ORAL at 23:03

## 2019-11-19 RX ADMIN — DULOXETINE HYDROCHLORIDE SCH MG: 30 CAPSULE, DELAYED RELEASE ORAL at 13:08

## 2019-11-19 RX ADMIN — NICOTINE SCH PATCH: 21 PATCH, EXTENDED RELEASE TRANSDERMAL at 07:39

## 2019-11-19 RX ADMIN — OLANZAPINE SCH MG: 5 TABLET, FILM COATED ORAL at 20:26

## 2019-11-19 RX ADMIN — Medication SCH MG: at 07:40

## 2019-11-19 RX ADMIN — DULOXETINE HYDROCHLORIDE SCH MG: 30 CAPSULE, DELAYED RELEASE ORAL at 07:39

## 2019-11-19 RX ADMIN — OLANZAPINE SCH MG: 5 TABLET, FILM COATED ORAL at 13:07

## 2019-11-19 RX ADMIN — PHENAZOPYRIDINE HYDROCHLORIDE SCH MG: 100 TABLET ORAL at 17:58

## 2019-11-19 RX ADMIN — LEVOTHYROXINE, LIOTHYRONINE SCH MG: 19; 4.5 TABLET ORAL at 07:40

## 2019-11-19 RX ADMIN — Medication SCH MG: at 20:26

## 2019-11-19 RX ADMIN — LEVOTHYROXINE SODIUM SCH MCG: 25 TABLET ORAL at 07:40

## 2019-11-19 NOTE — NUR
Nursing Progress Note



Legal hold: [id number]



Client on involuntary status for DTS



Report received from ADELAIDA Cao with use of SBAR



Why are they here: 

The patient stopped her medications, decompensated and became suicidal, and attempted 
suicide by taking Klonopin, OXY and Flexeril.







Assessment



What has happened this shift: 

Pt is upset at shift change and tells writer that she was mad at the day shift charge nurse. 
She is perseverating on this and keeps saying, "I am just so upset and I am legally blind 
and I feel like staff is just rude." She then says "well I did it again" she shows writer 
that she has two open scratches on her left ankle. Pt admits that this was self inflicted by 
using her fingernails to scratch herself.  Pictures were taken, antibiotic ointment and 
bandage applied. Pt said she did this because she was feeling overwhelmed and like she can't 
talk to anyone. She continues to indorse suicidal ideation saying things like, "yea life is 
hard, ladan don't want to do it anymore." 



She then says she has restless leg syndrome and wants to know what medications are good for 
this condition. Pt was encouraged to talk with the  tomorrow. Pt then comes up and says 
she had "bad heartburn", given 30ML Maalox. Pt then paces the unit and says she can not 
sleep. Writer informs her that it is still too early for her ativan. She then asks for 
benadryl. Writer informs her benadryl is not on her regimen, but encourages her to lay down 
and try to sleep. 



Writer talked to pt about coping mechanisms and coming to staff when she feels like wanting 
to hurt herself or if she is feeling overwhelmed. Pt verbalized that she would talk to staff 
if she felt like she wanted to hurt herself. 





S/I, H/I: endorses SI

A/VH:  does not endorse AH/VH this shift

Sleep: See sleep assessment

ADL's: Self

Group attendance: None during shift change

Were meds taken: Yes

Any med S/E: None





Mental Status Exam



Appearance: neat, wearing green scrubs

Eye contact: direct

Behavior: isolates in her room or is asking staff for things 

Speech: WNL

Mood: Depressed

Affect: depressed

Thought process:  linear this shift

Thought Content: anxiety, wanting to get sleep 

Cognition:alert

Insight:poor

Judgment:poor 







Interventions



PRN's used: Ativan,Maalox



Therapeutic interventions: establish rapport, active listening, maintain therapeutic 
environment, q15m safety checks, medication administration 



Restraints/seclusion/emergency medication: None





Justification of Continued Inpatient Treatment: Requires interruption of current crisis, 
medication adjustments, and a safe and supportive environment to prevent readmission.

## 2019-11-20 VITALS — DIASTOLIC BLOOD PRESSURE: 75 MMHG | SYSTOLIC BLOOD PRESSURE: 118 MMHG

## 2019-11-20 VITALS — DIASTOLIC BLOOD PRESSURE: 87 MMHG | SYSTOLIC BLOOD PRESSURE: 137 MMHG

## 2019-11-20 RX ADMIN — OLANZAPINE SCH MG: 5 TABLET, FILM COATED ORAL at 20:56

## 2019-11-20 RX ADMIN — Medication SCH MG: at 07:52

## 2019-11-20 RX ADMIN — Medication SCH MG: at 20:56

## 2019-11-20 RX ADMIN — LEVOTHYROXINE, LIOTHYRONINE SCH MG: 19; 4.5 TABLET ORAL at 07:51

## 2019-11-20 RX ADMIN — NICOTINE POLACRILEX PRN LOZ: 2 LOZENGE ORAL at 16:04

## 2019-11-20 RX ADMIN — OLANZAPINE SCH MG: 5 TABLET, FILM COATED ORAL at 12:51

## 2019-11-20 RX ADMIN — PHENAZOPYRIDINE HYDROCHLORIDE SCH MG: 100 TABLET ORAL at 07:52

## 2019-11-20 RX ADMIN — NICOTINE SCH PATCH: 21 PATCH, EXTENDED RELEASE TRANSDERMAL at 07:54

## 2019-11-20 RX ADMIN — LEVOTHYROXINE SODIUM SCH MCG: 25 TABLET ORAL at 07:50

## 2019-11-20 RX ADMIN — DULOXETINE HYDROCHLORIDE SCH MG: 30 CAPSULE, DELAYED RELEASE ORAL at 12:49

## 2019-11-20 RX ADMIN — DULOXETINE HYDROCHLORIDE SCH MG: 30 CAPSULE, DELAYED RELEASE ORAL at 07:54

## 2019-11-20 RX ADMIN — PHENAZOPYRIDINE HYDROCHLORIDE SCH MG: 100 TABLET ORAL at 12:49

## 2019-11-20 RX ADMIN — NICOTINE POLACRILEX PRN LOZ: 2 LOZENGE ORAL at 19:39

## 2019-11-20 RX ADMIN — PHENAZOPYRIDINE HYDROCHLORIDE SCH MG: 100 TABLET ORAL at 17:00

## 2019-11-20 NOTE — NUR
Nursing Progress Note



Legal hold: [id number]



Client on involuntary status for DTS



Report received from ADELAIDA Cao with use of SBAR



Why are they here: 

The patient stopped her medications, decompensated and became suicidal, and attempted 
suicide by taking Klonopin, OXY and Flexeril.







Assessment



What has happened this shift: 



Patient up in the morning at shift change. Patient does not have any issue with taking her 
medications and asks routinely for her meds. She understand what each is for and is able to 
identify them. Patient showered this morning. Patient attends all groups and is overheard 
telling the therapist I really enjoy your groups. RN asked patient what her plans are when 
she leaves the unit and what she will be doing when she goes home for Thanksgiving. Patient 
states that she does not feel safe to go home and is afraid that she may hurt herself. 
Patients states that she works and does plan to go back to work when she leaves here.



Patient states that she is anxious, has pain, has acid reflux, and restless leg. She reports 
not sleeping well last night and states that she would like a new medication for that.



Fentanyl patch removed and Butrans patched applied. Monitored for S/S of withdrawal with 
COWS Scale. Patient score a 2 at 1530.





S/I, H/I: S/I no plan

A/VH: none reported

Sleep: 6hrs NOC

ADL's: Independent, showered today

Group attendance: yes

Were meds taken: Yes

Any med S/E: None





Mental Status Exam



Appearance: appropriate

Eye contact: direct

Behavior: anxious 

Speech: WNL

Mood: reports depression, not observed by this RN

Affect: restricted

Thought process:  linear this shift

Thought Content: anxiety, wanting to get sleep 

Cognition: A/O x4

Insight:poor

Judgment:poor 







Interventions



PRN's used: Ativan, Maalox, Tylenol, Nicotine lozenge, clonidine, 



Therapeutic interventions: establish rapport, active listening, maintain therapeutic 
environment, q15m safety checks, medication administration 



Restraints/seclusion/emergency medication: None





Justification of Continued Inpatient Treatment: Requires interruption of current crisis, 
medication adjustments, and a safe and supportive environment to prevent readmission.

## 2019-11-20 NOTE — NUR
Nursing Progress Note



Legal hold: [id number]



Client on involuntary status for DTS



Report received from ADELAIDA Ernst with use of SBAR



Why are they here: 

The patient stopped her medications, decompensated and became suicidal, and attempted 
suicide by taking Klonopin, OXY and Flexeril.







Assessment



What has happened this shift: 

Patient was observed pacing the aisle during shift change.  Pt continues to be intrusive and 
asking for medications.  Was cooperative and allowed this writer to perform 1:1 physical 
assessment.  Denies AH/VH but does indicate S/I.  Patient did not state a plan





S/I, H/I: endorses SI

A/VH:  does not endorse AH/VH this shift

Sleep: See sleep assessment

ADL's: Self

Group attendance: None during shift change

Were meds taken: Yes

Any med S/E: None





Mental Status Exam



Appearance: neat, wearing green scrubs

Eye contact: direct

Behavior: isolates in her room or is asking staff for things 

Speech: WNL

Mood: Depressed

Affect: depressed

Thought process:  linear this shift

Thought Content: anxiety, wanting to get sleep 

Cognition:alert

Insight:poor

Judgment:poor 







Interventions



PRN's used: AtivanMaalox



Therapeutic interventions: establish rapport, active listening, maintain therapeutic 
environment, q15m safety checks, medication administration 



Restraints/seclusion/emergency medication: None





Justification of Continued Inpatient Treatment: Requires interruption of current crisis, 
medication adjustments, and a safe and supportive environment to prevent readmission.

-------------------------------------------------------------------------------

Addendum: 11/21/19 at 0006 by Tigist Martinez RN

-------------------------------------------------------------------------------

Pt is complaining because she states that she feel frustrated due to her medications and 
believes that she is going through withdrawals.  She scored a 2 on the COWS assessment tool. 
 She was compliant with all her medications and was given Ativan and a second dose of 
Trazodone.  Pt also asked for Neosporin and Bandage for her cuts that she has on her ankle.  
Pt took another shower this evening.  Pt remained in her room isolative for most of the 
night.
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
## 2019-11-21 VITALS — SYSTOLIC BLOOD PRESSURE: 112 MMHG | DIASTOLIC BLOOD PRESSURE: 59 MMHG

## 2019-11-21 VITALS — SYSTOLIC BLOOD PRESSURE: 122 MMHG | DIASTOLIC BLOOD PRESSURE: 80 MMHG

## 2019-11-21 LAB — MAGNESIUM SERPL-MCNC: 2 MG/DL (ref 1.5–2.4)

## 2019-11-21 RX ADMIN — Medication SCH MG: at 20:19

## 2019-11-21 RX ADMIN — LEVOTHYROXINE SODIUM SCH MCG: 25 TABLET ORAL at 07:08

## 2019-11-21 RX ADMIN — NICOTINE POLACRILEX PRN LOZ: 2 LOZENGE ORAL at 12:37

## 2019-11-21 RX ADMIN — NICOTINE SCH PATCH: 21 PATCH, EXTENDED RELEASE TRANSDERMAL at 08:22

## 2019-11-21 RX ADMIN — PHENAZOPYRIDINE HYDROCHLORIDE SCH MG: 100 TABLET ORAL at 17:28

## 2019-11-21 RX ADMIN — NICOTINE POLACRILEX PRN LOZ: 2 LOZENGE ORAL at 17:39

## 2019-11-21 RX ADMIN — DULOXETINE HYDROCHLORIDE SCH MG: 30 CAPSULE, DELAYED RELEASE ORAL at 12:35

## 2019-11-21 RX ADMIN — PHENAZOPYRIDINE HYDROCHLORIDE SCH MG: 100 TABLET ORAL at 08:18

## 2019-11-21 RX ADMIN — PHENAZOPYRIDINE HYDROCHLORIDE SCH MG: 100 TABLET ORAL at 12:35

## 2019-11-21 RX ADMIN — ONDANSETRON PRN MG: 4 TABLET, ORALLY DISINTEGRATING ORAL at 21:45

## 2019-11-21 RX ADMIN — OLANZAPINE SCH MG: 5 TABLET, FILM COATED ORAL at 20:19

## 2019-11-21 RX ADMIN — Medication SCH UNIT: at 08:21

## 2019-11-21 RX ADMIN — LEVOTHYROXINE, LIOTHYRONINE SCH MG: 19; 4.5 TABLET ORAL at 08:18

## 2019-11-21 RX ADMIN — Medication SCH MG: at 08:19

## 2019-11-21 RX ADMIN — OLANZAPINE SCH MG: 5 TABLET, FILM COATED ORAL at 12:36

## 2019-11-21 RX ADMIN — DULOXETINE HYDROCHLORIDE SCH MG: 30 CAPSULE, DELAYED RELEASE ORAL at 08:19

## 2019-11-21 NOTE — NUR
Nursing Progress Note



Legal hold: [id number]



Client on involuntary status for DTS



Report received from ADELAIDA Parks with use of SBAR



Why are they here: 

The patient stopped her medications, decompensated and became suicidal, and attempted 
suicide by taking Klonopin, OXY and Flexeril.







Assessment



What has happened this shift: 

Patient asleep at change of shift and awoken for breakfast.  Patient requesting many of her 
PRN medications and up to staff very often.  Dr. Giordano adjusting patients medications.  
Patient does not appear to be having any adverse effects from the Butrans.  Patient stated 
she is depressed and is currently suicidal. Patient also states she is hearing voices.  
Patient given med for migraine and sciatic pain today.  



S/I, H/I:  SI

A/VH:  AH reported

Sleep: None

ADL's: Self

Group attendance: yes

Were meds taken: Yes

Any med S/E: None





Mental Status Exam



Appearance: neat, wearing heavy sweater 

Eye contact: direct

Behavior: Labile

Speech: soft and clear

Mood: Depressed, sad

Affect: Sad

Thought process:  Paranoid

Thought Content: Feelings of anxiety

Cognition:alert

Insight:poor

Judgment:poor 







Interventions



PRN's used: Tylenol Atarax, ativan, excedrine, nicotine



Therapeutic interventions: establish rapport, active listening, maintain therapeutic 
environment, q15m safety checks, medication administration 



Restraints/seclusion/emergency medication: None





Justification of Continued Inpatient Treatment: Requires interruption of current crisis, 
medication adjustments, and a safe and supportive environment to prevent readmission.

## 2019-11-22 VITALS — DIASTOLIC BLOOD PRESSURE: 80 MMHG | SYSTOLIC BLOOD PRESSURE: 133 MMHG

## 2019-11-22 VITALS — DIASTOLIC BLOOD PRESSURE: 77 MMHG | SYSTOLIC BLOOD PRESSURE: 135 MMHG

## 2019-11-22 RX ADMIN — LEVOTHYROXINE SODIUM SCH MCG: 25 TABLET ORAL at 07:30

## 2019-11-22 RX ADMIN — Medication SCH MG: at 07:27

## 2019-11-22 RX ADMIN — Medication SCH UNIT: at 07:30

## 2019-11-22 RX ADMIN — PHENAZOPYRIDINE HYDROCHLORIDE SCH MG: 100 TABLET ORAL at 07:30

## 2019-11-22 RX ADMIN — NICOTINE POLACRILEX PRN LOZ: 2 LOZENGE ORAL at 05:03

## 2019-11-22 RX ADMIN — OLANZAPINE SCH MG: 5 TABLET, FILM COATED ORAL at 20:05

## 2019-11-22 RX ADMIN — DULOXETINE HYDROCHLORIDE SCH MG: 30 CAPSULE, DELAYED RELEASE ORAL at 07:31

## 2019-11-22 RX ADMIN — MAGNESIUM HYDROXIDE PRN ML: 400 SUSPENSION ORAL at 14:04

## 2019-11-22 RX ADMIN — LEVOTHYROXINE, LIOTHYRONINE SCH MG: 19; 4.5 TABLET ORAL at 07:28

## 2019-11-22 RX ADMIN — ONDANSETRON PRN MG: 4 TABLET, ORALLY DISINTEGRATING ORAL at 05:36

## 2019-11-22 RX ADMIN — DULOXETINE HYDROCHLORIDE SCH MG: 30 CAPSULE, DELAYED RELEASE ORAL at 12:53

## 2019-11-22 RX ADMIN — Medication SCH MG: at 20:04

## 2019-11-22 RX ADMIN — PHENAZOPYRIDINE HYDROCHLORIDE SCH MG: 100 TABLET ORAL at 17:56

## 2019-11-22 RX ADMIN — NICOTINE SCH PATCH: 21 PATCH, EXTENDED RELEASE TRANSDERMAL at 07:32

## 2019-11-22 RX ADMIN — ALUMINUM HYDROXIDE, MAGNESIUM HYDROXIDE, AND SIMETHICONE PRN ML: 200; 200; 20 SUSPENSION ORAL at 14:03

## 2019-11-22 RX ADMIN — PHENAZOPYRIDINE HYDROCHLORIDE SCH MG: 100 TABLET ORAL at 12:53

## 2019-11-22 RX ADMIN — OLANZAPINE SCH MG: 5 TABLET, FILM COATED ORAL at 12:53

## 2019-11-22 NOTE — NUR
DISCHARGE PLANNING



Spoke with ROSITA Mohr (ph# [phone]) with [facility] to inquire about transportation. 
She reported they can transport, they just need 24 hour notice. Will call back on Mon. Ct 
would like to discharge and be home for Thanksgiving.



ANTONIA Dick

## 2019-11-22 NOTE — NUR
Nursing Progress Note



Legal hold: [id number]



Client on involuntary status for DTS



Report received from ADELAIDA Stevens with use of SBAR



Why are they here: 

The patient stopped her medications, decompensated and became suicidal, and attempted 
suicide by taking Klonopin, OXY and Flexeril.







Assessment



What has happened this shift: 



Patient up this morning taking a shower. She states that she is unable to sleep because she 
gets so anxious. Patient does not have an issue with taking her meds and requests more 
medications throughout the day. After taking morning medications patient requests clonidine 
for symptoms of withdrawal. Patient describes her symptoms as nausea, achy, and anxious. 
Patient joins others for breakfast, forgets she asked for the medication and instead makes a 
request for Excedrin for a migraine, Excedrin administered. Patient asks if there is any 
medication she can take for a stuffy nose. RN suggest aromatherapy (lavender and eucalyptus) 
and patient accepts. Patient requests Magnesia for stomach acid and MOM for constipation. 
Patient request Tylenol for sciatica, Excedrin for her migraine again later in the afternoon 
and then requests Cogentin for a twitch in her lip. 



Patient states Im hoping to go home next week, like Monday. She says that she would like 
to have her medications set up in daily dosing bubble packs prior to leaving. Her pharmacy 
is [facility].



S/I, H/I: none reported

A/VH:  none reported

Sleep: 6.5hrs NOC 

ADL's: Self, showered today

Group attendance: yes

Were meds taken: Yes

Any med S/E: patient reports twitch in her lip





Mental Status Exam



Appearance: appropriate, clean

Eye contact: direct

Behavior: friendly

Speech: soft tone, normal rate and rhythm 

Mood: good

Affect: restricted

Thought process: linear 

Thought Content: various ailments and medications 

Cognition: A/O x4

Insight: poor

Judgment: poor 







Interventions



PRN's used: Excedrin, Magnesium, MOM, Tylenol, clonidine



Therapeutic interventions: 1:1 therapeutic assessment, maintained safe therapeutic milieu, 
provided active listening with positive reinforcement, provided medication 
administration/education/monitoring as needed; Q15 safety checks.



Restraints/seclusion/emergency medication: N/A



Justification of Continued Inpatient Treatment: Continued therapeutic support and medication 
management needed to provide stabilization, prevent decompensation, and improve coping 
mechanisms decreasing risk to patient and re-admittance.

## 2019-11-22 NOTE — NUR
Nursing Progress Note



Legal hold: [id number]



Client on involuntary status for DTS



Report received from ADELAIDA Ernst with use of SBAR



Why are they here: 

The patient stopped her medications, decompensated and became suicidal, and attempted 
suicide by taking Klonopin, OXY and Flexeril.







Assessment



What has happened this shift: 

Patient observed laying in her bed awake at the beginning of shift. Pt began asking about 
medications shortly after report. Asking about dosing, quantity per day and PRN 
accessibility. She was easily settled with each explanation and remained pleasant and 
cooperative with all care. Pt COWS assessment score was one. Patient continues ABx for acne 
and reported nausea after HS snack and was provided PRN Zofran with effective results. Pt 
originally refused to take off nicotine patch but later reported "bad dreams" and allowed 
this nurse to remove nicotine patch. 





S/I, H/I: none reported

A/VH:  none reported

Sleep: See sleep assessment

ADL's: Self

Group attendance: no groups this shift

Were meds taken: Yes

Any med S/E: Nightmares r/t nicotine patch





Mental Status Exam



Appearance: neat, wearing green scrubs

Eye contact: direct

Behavior: isolates in her room 

Speech: clear, steady, audible

Mood: "ok"

Affect: depressed

Thought process:  linear this shift

Thought Content: medications 

Cognition:alert

Insight:poor

Judgment:poor 







Interventions



PRN's used: Zofran, effective



Therapeutic interventions: establish rapport, active listening, maintain therapeutic 
environment, q15m safety checks, medication administration 



Restraints/seclusion/emergency medication: None





Justification of Continued Inpatient Treatment: Requires interruption of current crisis, 
medication adjustments, and a safe and supportive environment to prevent readmission.

## 2019-11-22 NOTE — NUR
Assisted Ct with going on-line to apply for SDI on her phone. The website was having 
problems and she said she would do the paper forms which her mom had mailed her. Ct asked if 
she could go home before Thanksgiving. Encouraged her to talk to her doctor about it.



ANTONIA Dick

## 2019-11-23 VITALS — SYSTOLIC BLOOD PRESSURE: 130 MMHG | DIASTOLIC BLOOD PRESSURE: 65 MMHG

## 2019-11-23 VITALS — SYSTOLIC BLOOD PRESSURE: 118 MMHG | DIASTOLIC BLOOD PRESSURE: 75 MMHG

## 2019-11-23 RX ADMIN — PHENAZOPYRIDINE HYDROCHLORIDE SCH MG: 100 TABLET ORAL at 17:16

## 2019-11-23 RX ADMIN — LEVOTHYROXINE, LIOTHYRONINE SCH MG: 19; 4.5 TABLET ORAL at 07:33

## 2019-11-23 RX ADMIN — OLANZAPINE SCH MG: 5 TABLET, FILM COATED ORAL at 12:30

## 2019-11-23 RX ADMIN — PHENAZOPYRIDINE HYDROCHLORIDE SCH MG: 100 TABLET ORAL at 07:33

## 2019-11-23 RX ADMIN — Medication SCH UNIT: at 07:33

## 2019-11-23 RX ADMIN — DULOXETINE HYDROCHLORIDE SCH MG: 30 CAPSULE, DELAYED RELEASE ORAL at 07:32

## 2019-11-23 RX ADMIN — OLANZAPINE SCH MG: 5 TABLET, FILM COATED ORAL at 20:18

## 2019-11-23 RX ADMIN — NICOTINE SCH PATCH: 21 PATCH, EXTENDED RELEASE TRANSDERMAL at 07:34

## 2019-11-23 RX ADMIN — Medication SCH MG: at 07:33

## 2019-11-23 RX ADMIN — MAGNESIUM HYDROXIDE PRN ML: 400 SUSPENSION ORAL at 07:34

## 2019-11-23 RX ADMIN — DULOXETINE HYDROCHLORIDE SCH MG: 30 CAPSULE, DELAYED RELEASE ORAL at 12:30

## 2019-11-23 RX ADMIN — LEVOTHYROXINE SODIUM SCH MCG: 25 TABLET ORAL at 07:33

## 2019-11-23 RX ADMIN — PHENAZOPYRIDINE HYDROCHLORIDE SCH MG: 100 TABLET ORAL at 13:00

## 2019-11-23 RX ADMIN — Medication SCH MG: at 20:19

## 2019-11-23 NOTE — NUR
Nursing Progress Note



Legal hold: [id number]



Client on involuntary status for DTS



Report received from Samara Jones RN with use of SBAR



Why are they here: 

The patient stopped her medications, decompensated and became suicidal, and attempted 
suicide by taking Klonopin, OXY and Flexeril.



Assessment



What has happened this shift: 



Patient is observed up and about in the morning at shift change. She politely waits for 
staff to get settled prior to taking her morning shower. Patient requests MOM for 
constipation and clonidine for her withdrawal symptoms. Patient showers, takes her 
medications and then eats breakfast with others in the group room. 

Patient checks to make sure that Rxs get called into [facility] in [facility] and placed in 
bubble packs, not single med dosing but multi-med dosing according to the time of day. 
Patient requests to go over her meds with this RN, RN assures patient that this will be done 
at time of discharge. Patient requests to have her Lithium dosing returned to [facility] at 
, patient is informed that this may not be able to be switched in our system. Patient 
states that she is ready to get home and get back to work.



S/I, H/I: none reported

A/VH:  none reported

Sleep: 6.75hrs NOC

ADL's: independent, showered today

Group attendance: yes

Were meds taken: Yes

Any med S/E: none reported 



Mental Status Exam



Appearance: clean, appropriate

Eye contact: direct

Behavior: friendly, calm, planning for discharge

Speech: clear, steady, audible

Mood: "good"

Affect: restricted, appropriate

Thought process:  linear, goal directed r/t discharge

Thought Content: discharge 

Cognition: a/o xs 4

Insight: good

Judgment: fair to good







Interventions



PRN's used: Clonidine for withdrawal symptoms, MOM for constipation, Excedrin for migraine



Therapeutic interventions: establish rapport, active listening, maintain therapeutic 
environment, q15m safety checks, medication administration 



Restraints/seclusion/emergency medication: None





Justification of Continued Inpatient Treatment: Requires interruption of current crisis, 
medication adjustments, and a safe and supportive environment to prevent readmission.

## 2019-11-23 NOTE — NUR
Reassessment: Pt continues with % PO intake on regular diet meeting 

nutrient needs. Wt stable. LBM 11/20. Pt with MoM PRN last given today. No 

nutrition diagnosis at this time. Will continue to follow.

Rec:

1. continue regular diet

2. routine bowel care

3. wt per rx

-------------------------------------------------------------------------------

Addendum: 11/23/19 at 1039 by Hortencia High RD

-------------------------------------------------------------------------------

Amended: Links added.

## 2019-11-23 NOTE — NUR
Nursing Progress Note



Legal hold: [id number]



Client on involuntary status for DTS



Report received from ADELAIDA Ernst with use of SBAR



Why are they here: 

The patient stopped her medications, decompensated and became suicidal, and attempted 
suicide by taking Klonopin, OXY and Flexeril.







Assessment



What has happened this shift: 

Patient talking on the phone at the beginning of shift expressing irritation r/t her 
"glasses falling apart." Patient pleasant and cooperative with all care and compliant with 
all medication. Patient reported suicidal thoughts without a plan, stating "they're just 
thoughts, the Zyprexa has been helping me." Patient c/o constipation and requested MOM 
however at the time she requested it was too early for a second dose and patient was offered 
and drank a prune juice. She expressed wanting to go home before Thanksgiving and wanted to 
talk with the doctor about D/C plan in which she was able to do. Patient reported to staff 
to have Rx called into [facility] in [facility]. Patient c/o increased anxiety r/t wanting 
to go home and has received PRN clonidine and Ativan this shift. Patient also reported 
difficulty sleeping and her nicotine patch was removed at this time and has not made further 
complaints. 





S/I, H/I: none reported

A/VH:  none reported

Sleep: See sleep assessment

ADL's: Self

Group attendance: no groups this shift

Were meds taken: Yes

Any med S/E: Nightmares r/t nicotine patch





Mental Status Exam



Appearance: neat, wearing green scrubs

Eye contact: direct

Behavior: talking on the phone, socializing 

Speech: clear, steady, audible

Mood: "good"

Affect: flat

Thought process:  linear this shift

Thought Content: discharge 

Cognition: alert

Insight: poor

Judgment: poor 







Interventions



PRN's used: Clonidine and Ativan, effective



Therapeutic interventions: establish rapport, active listening, maintain therapeutic 
environment, q15m safety checks, medication administration 



Restraints/seclusion/emergency medication: None





Justification of Continued Inpatient Treatment: Requires interruption of current crisis, 
medication adjustments, and a safe and supportive environment to prevent readmission.

## 2019-11-24 VITALS — SYSTOLIC BLOOD PRESSURE: 129 MMHG | DIASTOLIC BLOOD PRESSURE: 81 MMHG

## 2019-11-24 VITALS — SYSTOLIC BLOOD PRESSURE: 128 MMHG | DIASTOLIC BLOOD PRESSURE: 77 MMHG

## 2019-11-24 RX ADMIN — PHENAZOPYRIDINE HYDROCHLORIDE SCH MG: 100 TABLET ORAL at 07:56

## 2019-11-24 RX ADMIN — OLANZAPINE SCH MG: 5 TABLET, FILM COATED ORAL at 20:45

## 2019-11-24 RX ADMIN — ALUMINUM HYDROXIDE, MAGNESIUM HYDROXIDE, AND SIMETHICONE PRN ML: 200; 200; 20 SUSPENSION ORAL at 18:56

## 2019-11-24 RX ADMIN — DULOXETINE HYDROCHLORIDE SCH MG: 30 CAPSULE, DELAYED RELEASE ORAL at 06:55

## 2019-11-24 RX ADMIN — Medication SCH MG: at 07:41

## 2019-11-24 RX ADMIN — PHENAZOPYRIDINE HYDROCHLORIDE SCH MG: 100 TABLET ORAL at 13:10

## 2019-11-24 RX ADMIN — LEVOTHYROXINE, LIOTHYRONINE SCH MG: 19; 4.5 TABLET ORAL at 07:46

## 2019-11-24 RX ADMIN — DULOXETINE HYDROCHLORIDE SCH MG: 30 CAPSULE, DELAYED RELEASE ORAL at 13:10

## 2019-11-24 RX ADMIN — OLANZAPINE SCH MG: 5 TABLET, FILM COATED ORAL at 13:09

## 2019-11-24 RX ADMIN — NICOTINE SCH PATCH: 21 PATCH, EXTENDED RELEASE TRANSDERMAL at 07:55

## 2019-11-24 RX ADMIN — MAGNESIUM HYDROXIDE PRN ML: 400 SUSPENSION ORAL at 06:54

## 2019-11-24 RX ADMIN — Medication SCH MG: at 20:43

## 2019-11-24 RX ADMIN — PHENAZOPYRIDINE HYDROCHLORIDE SCH MG: 100 TABLET ORAL at 17:47

## 2019-11-24 RX ADMIN — NICOTINE POLACRILEX PRN LOZ: 2 LOZENGE ORAL at 14:28

## 2019-11-24 RX ADMIN — LEVOTHYROXINE SODIUM SCH MCG: 25 TABLET ORAL at 06:55

## 2019-11-24 RX ADMIN — Medication SCH UNIT: at 07:42

## 2019-11-24 RX ADMIN — ONDANSETRON PRN MG: 4 TABLET, ORALLY DISINTEGRATING ORAL at 09:07

## 2019-11-24 NOTE — NUR
Nursing Progress Note



Legal hold: [id number]



Client on involuntary status for DTS



Report received from ADELAIDA Ram with use of SBAR



Why are they here: 

The patient stopped her medications, decompensated and became suicidal, and attempted 
suicide by taking Klonopin, OXY and Flexeril.



Assessment



What has happened this shift: 



Pt is walking around unit at discharge. Pt states depressed 2/10, anxiety 8/10 r/t to having 
medications sorted by discharge; RN reassured pt that staff will ensure proper medications 
are ordered and will review with pt prior to discharge. Pt states she is looking forward to 
going home and feels much better, "I'm excited I get to have Thanksgiving with my family." 
Pt stated headache still bothering her 6/10 pain and requested more Tylenol. Pt had 
confusion regarding Butrans patch that was placed on Wednesday 11/20: this RN called night 
pharmacist, Bronwyn, to clarify that patch is to be removed 11/27 @ 1010 and then needs to be 
followed up with the pt's primary MD as it was a one time order in the hospital after 
Fentanyl patch was D/C'd. RN relayed this information and pt verbalized understanding. Pt 
compliant with all medications and went to sleep shortly after administration. 



S/I, H/I: Denies both

A/VH:  Denies both

Sleep: See Sleep Assessment

ADL's: Independent

Group attendance: N/A

Were meds taken: Yes

Any med S/E: None reported nor observed



Mental Status Exam



Appearance: Clean, appropriately wearing unit scrub pants and personal sweater; thick 
eyeglasses and hair in a ponytail

Eye contact: Direct

Behavior: Cooperative, Pleasant, Attended HS snack, Talking on phone in room, reading

Speech: Clear, normal rate and rhythm

Mood: "I feel good", Anxious 8/10

Affect: Restricted with brightening

Thought process:  Linear

Thought Content: Focused on discharge planning

Cognition: A/Ox4

Insight: Good

Judgment: Fair to Good



Interventions



PRN's used: Tylenol



Therapeutic interventions: Establish rapport, active listening, maintain therapeutic 
environment, q15m safety checks, medication administration 



Restraints/seclusion/emergency medication: None



Justification of Continued Inpatient Treatment: Requires interruption of current crisis, 
medication adjustments, and a safe and supportive environment to prevent readmission. Pt 
discharge pending for Monday.

## 2019-11-24 NOTE — NUR
Nursing Progress Note

Legal hold: 5250

Client on involuntary status for DTS

Report received from Samara Jones RN with use of SBAR

Why are they here: 

The patient stopped her medications, decompensated and became suicidal, and attempted 
suicide by taking Klonopin, OXY and Flexeril.

Assessment

What has happened this shift: 

Pt. awake at start of shift requesting medication for constipation. Pt. given milk of 
magnesia. Pt. took all medications and ate all meals in the community room. Pt.s scripts 
faxed to [facility] in [facility]. 1:1 done at bedside. Pt. denies SI/HI, A/V H. 
Pt. reports she is ready for discharge and [facility] Pt. given Tylenol this AM for back pain. Pt. given Clonidine this AM for 
anxiety. Pt. received Excedrin x2 for headache. 

S/I, H/I: Denies both

A/VH:  Denies both

Sleep: Pt. napped x2

ADL's: Independent

Group attendance: Yes

Were meds taken: Yes

Any med S/E: None reported nor observed

Mental Status Exam

Appearance: Clean, appropriately wearing unit scrub pants and personal sweater; thick 
eyeglasses and hair in a ponytail

Eye contact: Direct

Behavior: Cooperative, Pleasant, 

Speech: Clear, normal rate and rhythm

Mood: Euthymic with some anxiety 

Affect: Flat

Thought process:  Linear

Thought Content: Focused on discharge planning

Cognition: A/Ox4

Insight: Good

Judgment: Fair to Good

Interventions

PRN's used: Clonidine, Excedrine, Tylenol

Therapeutic interventions: Establish rapport, active listening, maintain therapeutic 
environment, q15m safety checks, medication administration 

Restraints/seclusion/emergency medication: None

Justification of Continued Inpatient Treatment: Requires interruption of current crisis, 
medication adjustments, and a safe and supportive environment to prevent readmission. Pt 
discharge pending for Monday.

## 2019-11-25 VITALS — SYSTOLIC BLOOD PRESSURE: 110 MMHG | DIASTOLIC BLOOD PRESSURE: 76 MMHG

## 2019-11-25 VITALS — DIASTOLIC BLOOD PRESSURE: 80 MMHG | SYSTOLIC BLOOD PRESSURE: 126 MMHG

## 2019-11-25 RX ADMIN — ONDANSETRON PRN MG: 4 TABLET, ORALLY DISINTEGRATING ORAL at 13:31

## 2019-11-25 RX ADMIN — DULOXETINE HYDROCHLORIDE SCH MG: 30 CAPSULE, DELAYED RELEASE ORAL at 08:24

## 2019-11-25 RX ADMIN — LEVOTHYROXINE SODIUM SCH MCG: 25 TABLET ORAL at 07:26

## 2019-11-25 RX ADMIN — PHENAZOPYRIDINE HYDROCHLORIDE SCH MG: 100 TABLET ORAL at 17:48

## 2019-11-25 RX ADMIN — PHENAZOPYRIDINE HYDROCHLORIDE SCH MG: 100 TABLET ORAL at 08:28

## 2019-11-25 RX ADMIN — ONDANSETRON PRN MG: 4 TABLET, ORALLY DISINTEGRATING ORAL at 20:52

## 2019-11-25 RX ADMIN — LEVOTHYROXINE, LIOTHYRONINE SCH MG: 19; 4.5 TABLET ORAL at 08:28

## 2019-11-25 RX ADMIN — DULOXETINE HYDROCHLORIDE SCH MG: 30 CAPSULE, DELAYED RELEASE ORAL at 13:21

## 2019-11-25 RX ADMIN — PHENAZOPYRIDINE HYDROCHLORIDE SCH MG: 100 TABLET ORAL at 13:21

## 2019-11-25 RX ADMIN — OLANZAPINE SCH MG: 5 TABLET, FILM COATED ORAL at 13:20

## 2019-11-25 RX ADMIN — OLANZAPINE SCH MG: 5 TABLET, FILM COATED ORAL at 20:41

## 2019-11-25 RX ADMIN — NICOTINE SCH PATCH: 21 PATCH, EXTENDED RELEASE TRANSDERMAL at 08:23

## 2019-11-25 RX ADMIN — Medication SCH UNIT: at 08:27

## 2019-11-25 RX ADMIN — ALUMINUM HYDROXIDE, MAGNESIUM HYDROXIDE, AND SIMETHICONE PRN ML: 200; 200; 20 SUSPENSION ORAL at 09:56

## 2019-11-25 NOTE — NUR
Nursing Progress Note

Legal hold: 5250

Client on involuntary status for DTS

Report received from ADELAIDA Zavala with use of SBAR

Why are they here: 

The patient stopped her medications, decompensated and became suicidal, and attempted 
suicide by taking Klonopin, OXY and Flexeril.

Assessment

What has happened this shift: 



Pt. in group room at start of shift.  Pt asked for Tylenol for sciatica.  Pt describes her 
mood as "good" said she is ready to go home.  Concerned about prescriptions.  Told pt that 
day shift said they hed faxed her prescriptions to the pharmacy pt still concerned.  
Encouraged to call her pharmacy tomorrow and make sure her prescriptions would be ready.  Pt 
pleasant and cooperative with care went to sleep easily.



S/I, H/I: Denies both

A/VH:  Denies both

Sleep: Pt. napped x2

ADL's: Independent

Group attendance: Yes

Were meds taken: Yes

Any med S/E: None reported nor observed

Mental Status Exam

Appearance: Clean, appropriately wearing unit scrub pants and personal sweater; thick 
eyeglasses and hair in a ponytail

Eye contact: Direct

Behavior: Cooperative, Pleasant, 

Speech: Clear, normal rate and rhythm

Mood: Euthymic with some anxiety 

Affect: Flat

Thought process:  Linear

Thought Content: Focused on discharge planning

Cognition: A/Ox4

Insight: Good

Judgment: Fair to Good

Interventions

PRN's used: Tylenol Maalox 

Therapeutic interventions: Establish rapport, active listening, maintain therapeutic 
environment, q15m safety checks, medication administration 

Restraints/seclusion/emergency medication: None

Justification of Continued Inpatient Treatment: Requires interruption of current crisis, 
medication adjustments, and a safe and supportive environment to prevent readmission. Pt 
discharge pending for Monday.

## 2019-11-25 NOTE — NUR
Nursing Progress Note

Legal hold: 5250

Client on involuntary status for DTS

Report received from ADELAIDA Carver with use of SBAR

Why are they here: 

The patient stopped her medications, decompensated and became suicidal, and attempted 
suicide by taking Klonopin, OXY and Flexeril.

Assessment

What has happened this shift: 

Pt. awake at start  shift and requesting tylenol for back pain rated 7/10. Pt. given tyleno 
with good effect. Pt. took medications and ate all meals in community room. 1:1 done at 
bedside. Pt. denies SI/HI, A/V hallucinations. Pt. c/o of migrain rated 8/10 and given 
Excedrin. Pt. reports excedrin not working and pt. given 1 time dose of Tordol 60mg IM. Pt. 
continued to c/o of migraine and given 2nd dose of excedrin. Pt. c/o of nausea and given 
zofran with minimal effect. Pt. isolative to room and laying in bed majority of afternoon. 
Pt.'s scripts faxed to [facility] in [place]. Pt.reports she is anxious 
about her discharge tomorrow. Pt. reports she lives with he rmother and her mother does not 
agree with all the medications she is taking. 

S/I, H/I: Denies 

A/VH:  Denies 

Sleep: Pt. napped throughout afternoon. 

ADL's: Independent

Group attendance: Yes

Were meds taken: Yes

Any med S/E: None reported nor observed

Mental Status Exam

Appearance: Clean, appropriately wearing unit scrub pants and personal sweater; thick 
eyeglasses and hair in a ponytail

Eye contact: Direct

Behavior: Cooperative, isolative

Speech: Clear, normal rate and rhythm

Mood: anxious 

Affect: Flat

Thought process:  Linear

Thought Content: fearful of discharge

Cognition: A/Ox4

Insight: Good

Judgment: Fair to Good

Interventions

PRN's used: Tylenol, tordol, excedrin, zofran

Therapeutic interventions: Establish rapport, active listening, maintain therapeutic 
environment, q15m safety checks, medication administration 

Restraints/seclusion/emergency medication: None

Justification of Continued Inpatient Treatment: Requires interruption of current crisis, 
medication adjustments, and a safe and supportive environment to prevent readmission. Pt 
discharge pending for Monday.

## 2019-11-25 NOTE — NUR
DISCHARGE PLANNING



Called ROSITA Mohr (ph# [phone]) with [facility] to arrange for transportation. She 
reported she did NOT receive any calls yesterday for transportation. She reported a  
will be here around 11 or 12 tomorrow. 



ANTONIA Dick

## 2019-11-26 RX ADMIN — PHENAZOPYRIDINE HYDROCHLORIDE SCH MG: 100 TABLET ORAL at 11:23

## 2019-11-26 RX ADMIN — OLANZAPINE SCH MG: 5 TABLET, FILM COATED ORAL at 11:23

## 2019-11-26 RX ADMIN — LEVOTHYROXINE SODIUM SCH MCG: 25 TABLET ORAL at 06:59

## 2019-11-26 RX ADMIN — DULOXETINE HYDROCHLORIDE SCH MG: 30 CAPSULE, DELAYED RELEASE ORAL at 11:23

## 2019-11-26 RX ADMIN — LEVOTHYROXINE, LIOTHYRONINE SCH MG: 19; 4.5 TABLET ORAL at 07:43

## 2019-11-26 RX ADMIN — Medication SCH UNIT: at 07:43

## 2019-11-26 RX ADMIN — NICOTINE SCH PATCH: 21 PATCH, EXTENDED RELEASE TRANSDERMAL at 07:45

## 2019-11-26 RX ADMIN — PHENAZOPYRIDINE HYDROCHLORIDE SCH MG: 100 TABLET ORAL at 07:42

## 2019-11-26 RX ADMIN — DULOXETINE HYDROCHLORIDE SCH MG: 30 CAPSULE, DELAYED RELEASE ORAL at 07:40

## 2019-11-26 NOTE — NUR
Nursing Progress Note

Legal hold: 5250

Client on involuntary status for DTS

Report received from Wen RN with use of SBAR

Why are they here: 

The patient stopped her medications, decompensated and became suicidal, and attempted 
suicide by taking Klonopin, OXY and Flexeril.

Assessment



What has happened this shift: 



Pt up on unit at start of shift.  Requesting cell phone be charged so she can recieve a call 
from SS tommorrow.  Pt has a good relationship with [facility] and is concerned about her 
needs.  Pt socialized in group room with other pts bu c/o migraine and went to room.  
Requested and given Zofran for nausea RT to migraine.  



Pt says she is looking [id number] to discharge tomorrow.  Had some questions about home 
medications, questions answered, pt verbalized understanding. 



S/I, H/I: Denies 

A/VH:  Denies 

Sleep: Pt. napped throughout afternoon. 

ADL's: Independent

Group attendance: Yes

Were meds taken: Yes

Any med S/E: None reported nor observed

Mental Status Exam

Appearance: Clean, appropriately wearing unit scrub pants and personal sweater; thick 
eyeglasses and hair in a ponytail

Eye contact: Direct

Behavior: Cooperative, isolative

Speech: Clear, normal rate and rhythm

Mood: anxious 

Affect: Flat

Thought process:  Linear

Thought Content: fearful of discharge

Cognition: A/Ox4

Insight: Good

Judgment: Fair to Good

Interventions

PRN's used:  Zofran

Therapeutic interventions: Establish rapport, active listening, maintain therapeutic 
environment, q15m safety checks, medication administration 

Restraints/seclusion/emergency medication: None

Justification of Continued Inpatient Treatment: Requires interruption of current crisis, 
medication adjustments, and a safe and supportive environment to prevent readmission. Pt 
discharge pending for Monday.

## 2019-11-26 NOTE — NUR
Discharge Note: Discharge time 1355. Pt. discharged to her home via [facility] 
transportation. Pt. discharged with all of her belongings. Pt. was calm and cooperative. 
Denies SI/HI,A/V Hallucinations. All scripts faxed to [facility] in 
[place]. RN reviewed medications and and f/u plans with pt. and pt. verbalizes 
understanding.  Pt. discharged with scripts for nicotine replacement.

## 2021-01-01 NOTE — NUR
Nursing Progress Note



Legal hold: 5250



Client on involuntary status for DTS



Report received from ADELAIDA Cao with use of SBAR



Why are they here: 

The patient stopped her medications, decompensated and became suicidal, and attempted 
suicide by taking Klonopin, OXY and Flexeril.







Assessment



What has happened this shift: 



 Pt isolates to her room unless she needs something. She makes frequent requests. Pt is 
cooperative with 1:1 assessment, and medication compliant. She said she feels very anxious 
over her DR. possibly changing her fentanyl patch, and requests ativan which was given. Pt 
continues to endorse passive SI and AH. Writer does not observe pt responding to any 
internal stimuli this shift. 





S/I, H/I: endorses SI

A/VH:  endorses AH

Sleep: See sleep assessment

ADL's: Self

Group attendance: None during shift change

Were meds taken: Yes

Any med S/E: None





Mental Status Exam



Appearance: neat, wearing green scrubs

Eye contact: direct

Behavior: isolates in her room or is asking staff for things 

Speech: WNL

Mood: Depressed

Affect: depressed

Thought process:  linear this shift

Thought Content: anxiety, wanting to get sleep 

Cognition:alert

Insight:poor

Judgment:poor 







Interventions



PRN's used: Ativan



Therapeutic interventions: establish rapport, active listening, maintain therapeutic 
environment, q15m safety checks, medication administration 



Restraints/seclusion/emergency medication: None





Justification of Continued Inpatient Treatment: Requires interruption of current crisis, 
medication adjustments, and a safe and supportive environment to prevent readmission. 2021 05:20

## 2021-04-01 NOTE — NUR
Nursing Progress Note: 



Legal hold: 5250 Exp. 8/2 @ 1801



Client on involuntary status for DTS.



Report received from ADELAIDA Moss with use of SBAR.



Why are they here: The patient is a 33 year old female admitted on a 5150 hold for DTS. She 
was transported by EMS from [facility] after being evaluated by [facility] and a 5150 was written. She reportedly took 20 Klonopin tabs in a 
suicide attempt. While in there ER she AWOL'd from the ER. She was in physical restraints 
for over 24 hours at one point. When asked why she was in restraints she stated "because the 
doctor was a bitch" She also required increase observation while using the bathroom because 
she was engaging in self harm behaviors. She has an open area on her left hand from 
scratching. 



Assessment



What has happened this shift: Patient was sleeping at change of shift and up before 
breakfast.  Patient is less depressed today and no requiring as much attention.  Patient 
states she is feeling better today though patient states she is still depressed with SI but 
won't talk about the plan.  Patient has not requested any pain medication as of this 
writing.  Patient was awoken at 0200 this morning to have her Fentanyl patch checked because 
it requires a 12 hour check and was placed yesterday at 1400ish.  Patient said it took her a 
long time to go back to sleep after awoken.  Patient is tired but brighter today.



SI/HI: Sucidal ideation

A/VH: Denies. 

Sleep: None during the day.

ADL's: Independent

Group attendance: yes,

Were Meds taken: Medication compliant

Any med S/E: Thirst



Mental Status Exam



Appearance: Pt. with thick glasses, adequately groomed and dressed for unit.

Eye contact: Fair

Behavior: Cooperative, anxious

Speech: Clear, normal volume, slightly pressured at times

Mood: Depressed, anxious

Affect: Constricted

Thought process: Linear

Thought Content: focused on managing water intake

Cognition: Intact

Insight: Fair

Judgment: Fair



Interventions



PRN's used: Olazapine, Nicotine lozenges, MOM



Therapeutic interventions: 1:1 assessment at bedside, provided therapeutic communication, 
encouraged to go to groups, water restriction as above, coping skills, medication 
administration/education/monitoring for compliance, monitor Q15 minutes for safety. 



Restraints/seclusion/emergency medication: None.



Justification of Continued Inpatient Treatment: Pt. had a severe suicide attempt further 
medication adjustment to stabilize current crisis is needed, monitoring patient behaviors. 
Without adequate treatment for current situation, pt is at high risk for readmission if 
discharged at this time. Pharmacy is calling regarding Refill. Writer advised caller of a callback from the nurse within 24-72 hours.     Patient Name: Melba Fieldsdima  Caller Name: Melyssa  Name of Facility: .n/a  Callback Number: [phone]  Fax Number: n/a  Additional Info: refill request  Did you confirm the message with the caller?: yes    Thank you,  Donna Verma

## 2022-07-09 NOTE — NUR
Nursing Progress Note



Legal hold: 5250



Client on involuntary status for DTS



Report received from ADELAIDA Moss with use of SBAR



Why are they here: 

The patient stopped her medications, decompensated and became suicidal, and attempted 
suicide by taking Klonopin, OXY and Flexeril.







Assessment



What has happened this shift: 

The patient was awake at change of shift. She was up to breakfast interacting with her peers 
and being helpful and supportive of others. Doing better with new roommate. Calm and able to 
sleep.  Mid morning got a call from her sister and became shaken up because reportedly the 
sister told her she was going to report her for not taking care of her guide dog. (the dog 
is being cared for by her sister while she is hospitalized.) She immediately asked for an 
Ativan and was shaking and sobbing. She was able to talk thru her feelings and realized that 
her sister triggers strong emotions and gets her very upset. It was suggested she not take 
phone calls from her sister which she agreed was a good idea. She did not need the Ativan 
and instead went and laid on her bed and practiced deep breathing. Talking to her peers is 
also a coping skill of hers and this was pointed out to her.  



S/I, H/I:  SI

A/VH:  AH reported

Sleep: None

ADL's: Self

Group attendance: yes

Were meds taken: Yes

Any med S/E: None





Mental Status Exam



Appearance: neat, wearing heavy sweater 

Eye contact: direct

Behavior: Labile

Speech: soft and clear

Mood: Depressed, sad

Affect: Sad

Thought process:  Paranoid

Thought Content: Feelings of anxiety

Cognition:alert

Insight:poor

Judgment:poor 







Interventions



PRN's used: Tylenol Atarax, Jigar Irving



Therapeutic interventions: establish rapport, active listening, maintain therapeutic 
environment, q15m safety checks, medication administration 



Restraints/seclusion/emergency medication: None





Justification of Continued Inpatient Treatment: Requires interruption of current crisis, 
medication adjustments, and a safe and supportive environment to prevent readmission. 5

## 2024-10-14 NOTE — NUR
NURSING PROGRESS NOTE



Legal hold: 5250



Client on involuntary status for DTS.



Report received from Ruby SON, with use of SBAR.



Why are they here: The patient is a 33 year old female admitted on a 5150 hold for DTS. She 
was transported by EMS from [facility] after being evaluated by [facility] and a 5150 was written. She reportedly took 20 Klonopin tabs in a 
suicide attempt. While in there ER she AWOL'd from the ER. She was in physical restraints 
for over 24 hours at one point. When asked why she was in restraints she stated "because the 
doctor was a bitch" She also required increase observation while using the bathroom because 
she was engaging in self harm behaviors. She has an open area on her left hand from 
scratching. 



Assessment



What has happened this shift:

The patient was awake at change of shift. Depressed mood and anxious affect. Denies suicidal 
thoughts. Spending time in morning working extensively on worksheets, asking for help and 
trying to make her way through. Educated and intelligent. Cooperative and polite. Seeking 
help.  Multiple somatic complaints throughout day, back pain, menstrual cramping, and 
stomach upset due to acid reflux.  She spent some time mid morning reading a book. At 
approx. 10:00 she reported hearing "muffled voices" as her anxiety increased, asked for a 
prn and received zyprexa, she then spent time walking in hallway in attempt to calm self 
which did seem quite effective. Reports her Mother is "controlling and negative" about her 
taking medications and this causes contention between them at home. She attends all groups 
and meals and is medication compliant.



SI/HI: Denies SI

A/VH: muffled AH's

Sleep: Naps at times

ADL's: Independent

Group attendance: x2

Were Meds taken: Yes

Any med S/E: thirst



Mental Status Exam



Appearance: Pt. with thick glasses, adequately groomed and dressed for environment

Eye contact: minimal 

Behavior: Anxious, cooperative

Speech: Clear, normal volume and rate

Mood: Depressed, anxious

Affect: Blunted

Thought process: Goal oriented

Thought Content: orksheet completion

Cognition: A & O X4

Insight: Poor

Judgment: Poor 



Interventions



PRN's used: Zyprexa, tylenol, maalox



Therapeutic interventions: 1:1 assessment at bedside, provided therapeutic communication, 
encouraged to go to groups, water restriction as above, coping skills, medication 
administration/education/monitoring for compliance, monitor Q15 minutes for safety. 



Restraints/seclusion/emergency medication: None.



Justification of Continued Inpatient Treatment: Pt. had a severe suicide attempt further 
medication adjustment to stabilize current crisis is needed, monitoring patient behaviors. 
Without adequate treatment for current situation, pt is at high risk for readmission if 
discharged at this time. Please get updated fasting blood work     Blood pressure is good today at 122/78     Please start the samples of the semaglutide 0.25 mg weekly, after 4 weeks increase to 0.5 mg weekly dose.   If you like the medication, we can send an RX to the compounding pharmacy .   0.5 mg per week will be 10 units weekly.     Please restart the lexapro for the depression and anxiety     Please schedule the mammogram at [facility]    Please completed the stool test when it comes to your home.     Follow up in roughly 2 months or sooner as needed